# Patient Record
Sex: FEMALE | Race: AMERICAN INDIAN OR ALASKA NATIVE | HISPANIC OR LATINO | Employment: FULL TIME | URBAN - METROPOLITAN AREA
[De-identification: names, ages, dates, MRNs, and addresses within clinical notes are randomized per-mention and may not be internally consistent; named-entity substitution may affect disease eponyms.]

---

## 2021-03-28 ENCOUNTER — HOSPITAL ENCOUNTER (EMERGENCY)
Facility: CLINIC | Age: 21
Discharge: HOME | End: 2021-03-28
Attending: EMERGENCY MEDICINE | Admitting: EMERGENCY MEDICINE
Payer: COMMERCIAL

## 2021-03-28 VITALS
WEIGHT: 188 LBS | BODY MASS INDEX: 34.6 KG/M2 | TEMPERATURE: 97.8 F | HEART RATE: 76 BPM | SYSTOLIC BLOOD PRESSURE: 113 MMHG | DIASTOLIC BLOOD PRESSURE: 68 MMHG | HEIGHT: 62 IN | OXYGEN SATURATION: 98 % | RESPIRATION RATE: 16 BRPM

## 2021-03-28 DIAGNOSIS — R30.0 DYSURIA: ICD-10-CM

## 2021-03-28 LAB
ALBUMIN UR-MCNC: NEGATIVE MG/DL
APPEARANCE UR: CLEAR
BACTERIA #/AREA URNS HPF: ABNORMAL /HPF
BILIRUB UR QL STRIP: NEGATIVE
COLOR UR AUTO: YELLOW
GLUCOSE UR STRIP-MCNC: NEGATIVE MG/DL
HCG UR QL: NEGATIVE
HGB UR QL STRIP: ABNORMAL
KETONES UR STRIP-MCNC: NEGATIVE MG/DL
LEUKOCYTE ESTERASE UR QL STRIP: NEGATIVE
MUCOUS THREADS #/AREA URNS LPF: PRESENT /LPF
NITRATE UR QL: NEGATIVE
PH UR STRIP: 5.5 PH (ref 5–7)
RBC #/AREA URNS AUTO: 1 /HPF (ref 0–2)
SOURCE: ABNORMAL
SP GR UR STRIP: 1.01 (ref 1–1.03)
SPECIMEN SOURCE: ABNORMAL
SQUAMOUS #/AREA URNS AUTO: 4 /HPF (ref 0–1)
UROBILINOGEN UR STRIP-MCNC: 2 MG/DL (ref 0–2)
WBC #/AREA URNS AUTO: 4 /HPF (ref 0–5)
WET PREP SPEC: ABNORMAL

## 2021-03-28 PROCEDURE — 81025 URINE PREGNANCY TEST: CPT | Performed by: EMERGENCY MEDICINE

## 2021-03-28 PROCEDURE — 81001 URINALYSIS AUTO W/SCOPE: CPT | Performed by: EMERGENCY MEDICINE

## 2021-03-28 PROCEDURE — 87591 N.GONORRHOEAE DNA AMP PROB: CPT | Performed by: EMERGENCY MEDICINE

## 2021-03-28 PROCEDURE — 99283 EMERGENCY DEPT VISIT LOW MDM: CPT | Performed by: EMERGENCY MEDICINE

## 2021-03-28 PROCEDURE — 99284 EMERGENCY DEPT VISIT MOD MDM: CPT | Performed by: EMERGENCY MEDICINE

## 2021-03-28 PROCEDURE — 87210 SMEAR WET MOUNT SALINE/INK: CPT | Performed by: EMERGENCY MEDICINE

## 2021-03-28 PROCEDURE — 87491 CHLMYD TRACH DNA AMP PROBE: CPT | Performed by: EMERGENCY MEDICINE

## 2021-03-28 PROCEDURE — 87086 URINE CULTURE/COLONY COUNT: CPT | Performed by: EMERGENCY MEDICINE

## 2021-03-28 RX ORDER — ALBUTEROL SULFATE 90 UG/1
2 AEROSOL, METERED RESPIRATORY (INHALATION) EVERY 6 HOURS
COMMUNITY
End: 2021-12-03

## 2021-03-28 RX ORDER — METRONIDAZOLE 500 MG/1
500 TABLET ORAL 3 TIMES DAILY
Qty: 21 TABLET | Refills: 0 | Status: SHIPPED | OUTPATIENT
Start: 2021-03-28 | End: 2021-04-04

## 2021-03-28 RX ORDER — METRONIDAZOLE 500 MG/1
500 TABLET ORAL 4 TIMES DAILY
Qty: 28 TABLET | Refills: 0 | Status: SHIPPED | OUTPATIENT
Start: 2021-03-28 | End: 2021-04-04

## 2021-03-28 ASSESSMENT — ENCOUNTER SYMPTOMS
ABDOMINAL PAIN: 1
BACK PAIN: 1
FEVER: 0
CHILLS: 0
DYSURIA: 1
NAUSEA: 0
VOMITING: 0
FREQUENCY: 1

## 2021-03-28 ASSESSMENT — MIFFLIN-ST. JEOR: SCORE: 1581.01

## 2021-03-28 NOTE — RESULT ENCOUNTER NOTE
[facility] Emergency Dept scharge antibiotic (if prescribed): None  No changes in treatment per [facility] ED Lab Result Urine Urine culture protocol.

## 2021-03-28 NOTE — LETTER
March 28, 2021      To Whom It May Concern:      Katharine Piña was seen in our Emergency Department today, 03/28/21.  I expect her condition to improve over the next 2 days.  She may return to work/school when improved.    Sincerely,        Felipe Miller MD

## 2021-03-28 NOTE — ED PROVIDER NOTES
[facility] EMERGENCY DEPARTMENT ([facility])     March 28, 2021    History     Chief Complaint   Patient presents with     Urinary Frequency     Patient reports urinary frequency and urgency since monday, burning sensation lsat monday but has resolve since then.      The history is provided by the patient and medical records.     Katharine Piña is an otherwise healthy 20 year old female who presents here to the Emergency Department due to urinary frequency and urgency.  Patient reports urinary symptoms that started on Monday, 6 days ago.  She reports urinary urgency, frequency, and dysuria.  Patient state the dysuria has since resolved.  Patient reports some mid abdominal pain that she states radiates through to her back somewhat.  She denies nausea or vomiting.  Patient denies fevers or chills.  Patient denies vaginal discharge.  Patient reports a history of urinary infections.  She states this feels similar to previous infections.  Patient expresses some concern for pregnancy.    PAST MEDICAL HISTORY: No past medical history on file.    PAST SURGICAL HISTORY: No past surgical history on file.    Past medical history, past surgical history, medications, and allergies were reviewed with the patient. Additional pertinent items: None    FAMILY HISTORY: No family history on file.    SOCIAL HISTORY:   Social History     Tobacco Use     Smoking status: Not on file   Substance Use Topics     Alcohol use: Not on file     Social history was reviewed with the patient. Additional pertinent items: None      Patient's Medications   New Prescriptions    No medications on file   Previous Medications    ALBUTEROL (PROAIR HFA/PROVENTIL HFA/VENTOLIN HFA) 108 (90 BASE) MCG/ACT INHALER    Inhale 2 puffs into the lungs every 6 hours    WIXELA INHUB 100-50 MCG/DOSE INHALER    Inhale 1 puff into the lungs 2 times daily   Modified Medications    No medications on file   Discontinued Medications    No medications on file         No Known Allergies     Review of Systems   Constitutional: Negative for chills and fever.   Gastrointestinal: Positive for abdominal pain (mid). Negative for nausea and vomiting.   Genitourinary: Positive for dysuria, frequency and urgency.   Musculoskeletal: Positive for back pain (2/2 back pain).   All other systems reviewed and are negative.    A complete review of systems was performed with pertinent positives and negatives noted in the HPI, and all other systems negative.    Physical Exam          Physical Exam  Vitals signs and nursing note reviewed.   Constitutional:       General: She is not in acute distress.     Appearance: She is not diaphoretic.   HENT:      Head: Normocephalic and atraumatic.   Eyes:      Conjunctiva/sclera: Conjunctivae normal.      Pupils: Pupils are equal, round, and reactive to light.   Neck:      Musculoskeletal: Normal range of motion and neck supple.   Cardiovascular:      Rate and Rhythm: Normal rate and regular rhythm.   Pulmonary:      Effort: Pulmonary effort is normal. No respiratory distress.   Abdominal:      General: There is no distension.      Palpations: Abdomen is soft.      Tenderness: There is no abdominal tenderness. There is left CVA tenderness. There is no right CVA tenderness or guarding.   Musculoskeletal: Normal range of motion.         General: No signs of injury.   Skin:     General: Skin is warm and dry.   Neurological:      Mental Status: She is alert and oriented to person, place, and time.   Psychiatric:         Mood and Affect: Mood normal.         Behavior: Behavior normal.         Thought Content: Thought content normal.         ED Course   12:06 PM  The patient was seen and examined by Felipe Miller MD in Room ED07.       Procedures                           No results found for this or any previous visit (from the past 24 hour(s)).  Medications - No data to display          Assessments & Plan (with Medical Decision Making)   Katharine knapp  with urinary frequency and urgency.  Urinalysis was not convincing for a UTI with only 4 WBC and neg nitrite and LE.  Wet prepe showed BV.  GC/Chlamydia are pending.  Exam was benign and I don't expect other serious pathology.  Katharine will be treated with Flagyl for BV.      I have reviewed the nursing notes.    I have reviewed the findings, diagnosis, plan and need for follow up with the patient.    New Prescriptions    No medications on file       Final diagnoses:   None   I, Shelby Garcia, am serving as a trained medical scribe to document services personally performed by Felipe Miller MD, based on the provider's statements to me.     I, Felipe Miller MD, was physically present and have reviewed and verified the accuracy of this note documented by Shelby Garcia.      --  Felipe Miller MD  3/28/2021   [facility] EMERGENCY DEPARTMENT     Felipe Miller MD  03/28/21 1725

## 2021-03-29 LAB
BACTERIA SPEC CULT: NORMAL
C TRACH DNA SPEC QL NAA+PROBE: NEGATIVE
Lab: NORMAL
N GONORRHOEA DNA SPEC QL NAA+PROBE: NEGATIVE
SPECIMEN SOURCE: NORMAL

## 2021-03-29 NOTE — RESULT ENCOUNTER NOTE
Final urine culture report is NEGATIVE  No change in treatment per [facility] ED Lab Result Urine Culture protocol.

## 2021-03-29 NOTE — RESULT ENCOUNTER NOTE
Final result for both N. Gonorrhoeae PCR and Chlamydia Trachomatis PCR are NEGATIVE.  No treatment or change in treatment per [place] ED Lab Result protocol.

## 2021-12-03 ENCOUNTER — APPOINTMENT (OUTPATIENT)
Dept: GENERAL RADIOLOGY | Facility: CLINIC | Age: 21
End: 2021-12-03
Attending: INTERNAL MEDICINE
Payer: COMMERCIAL

## 2021-12-03 ENCOUNTER — HOSPITAL ENCOUNTER (EMERGENCY)
Facility: CLINIC | Age: 21
Discharge: HOME | End: 2021-12-03
Attending: INTERNAL MEDICINE | Admitting: INTERNAL MEDICINE
Payer: COMMERCIAL

## 2021-12-03 VITALS
TEMPERATURE: 98.2 F | SYSTOLIC BLOOD PRESSURE: 136 MMHG | HEIGHT: 62 IN | BODY MASS INDEX: 32.02 KG/M2 | OXYGEN SATURATION: 98 % | WEIGHT: 174 LBS | DIASTOLIC BLOOD PRESSURE: 82 MMHG | RESPIRATION RATE: 16 BRPM | HEART RATE: 70 BPM

## 2021-12-03 DIAGNOSIS — J10.1 INFLUENZA A: ICD-10-CM

## 2021-12-03 DIAGNOSIS — J18.9 PNEUMONIA DUE TO INFECTIOUS ORGANISM, UNSPECIFIED LATERALITY, UNSPECIFIED PART OF LUNG: ICD-10-CM

## 2021-12-03 DIAGNOSIS — Z11.52 ENCOUNTER FOR SCREENING LABORATORY TESTING FOR SEVERE ACUTE RESPIRATORY SYNDROME CORONAVIRUS 2 (SARS-COV-2): ICD-10-CM

## 2021-12-03 LAB
ALBUMIN UR-MCNC: 10 MG/DL
ANION GAP SERPL CALCULATED.3IONS-SCNC: 5 MMOL/L (ref 3–14)
APPEARANCE UR: ABNORMAL
BACTERIA #/AREA URNS HPF: ABNORMAL /HPF
BASOPHILS # BLD AUTO: 0.1 10E3/UL (ref 0–0.2)
BASOPHILS NFR BLD AUTO: 1 %
BILIRUB UR QL STRIP: NEGATIVE
BUN SERPL-MCNC: 6 MG/DL (ref 7–30)
CALCIUM SERPL-MCNC: 9 MG/DL (ref 8.5–10.1)
CHLORIDE BLD-SCNC: 109 MMOL/L (ref 94–109)
CO2 SERPL-SCNC: 25 MMOL/L (ref 20–32)
COLOR UR AUTO: YELLOW
CREAT SERPL-MCNC: 0.51 MG/DL (ref 0.52–1.04)
EOSINOPHIL # BLD AUTO: 0.1 10E3/UL (ref 0–0.7)
EOSINOPHIL NFR BLD AUTO: 1 %
ERYTHROCYTE [DISTWIDTH] IN BLOOD BY AUTOMATED COUNT: 12.5 % (ref 10–15)
FLUAV RNA SPEC QL NAA+PROBE: POSITIVE
FLUBV RNA RESP QL NAA+PROBE: NEGATIVE
GFR SERPL CREATININE-BSD FRML MDRD: >90 ML/MIN/1.73M2
GLUCOSE BLD-MCNC: 80 MG/DL (ref 70–99)
GLUCOSE UR STRIP-MCNC: NEGATIVE MG/DL
HCG UR QL: NEGATIVE
HCT VFR BLD AUTO: 40.4 % (ref 35–47)
HGB BLD-MCNC: 13.9 G/DL (ref 11.7–15.7)
HGB UR QL STRIP: NEGATIVE
HOLD SPECIMEN: NORMAL
IMM GRANULOCYTES # BLD: 0 10E3/UL
IMM GRANULOCYTES NFR BLD: 0 %
INTERNAL QC OK POCT: NORMAL
KETONES UR STRIP-MCNC: NEGATIVE MG/DL
LEUKOCYTE ESTERASE UR QL STRIP: ABNORMAL
LYMPHOCYTES # BLD AUTO: 1.3 10E3/UL (ref 0.8–5.3)
LYMPHOCYTES NFR BLD AUTO: 38 %
MCH RBC QN AUTO: 31.6 PG (ref 26.5–33)
MCHC RBC AUTO-ENTMCNC: 34.4 G/DL (ref 31.5–36.5)
MCV RBC AUTO: 92 FL (ref 78–100)
MONOCYTES # BLD AUTO: 0.5 10E3/UL (ref 0–1.3)
MONOCYTES NFR BLD AUTO: 14 %
MUCOUS THREADS #/AREA URNS LPF: PRESENT /LPF
NEUTROPHILS # BLD AUTO: 1.6 10E3/UL (ref 1.6–8.3)
NEUTROPHILS NFR BLD AUTO: 46 %
NITRATE UR QL: NEGATIVE
NRBC # BLD AUTO: 0 10E3/UL
NRBC BLD AUTO-RTO: 0 /100
PH UR STRIP: 6.5 [PH] (ref 5–7)
PLATELET # BLD AUTO: 304 10E3/UL (ref 150–450)
POTASSIUM BLD-SCNC: 3.2 MMOL/L (ref 3.4–5.3)
RBC # BLD AUTO: 4.4 10E6/UL (ref 3.8–5.2)
RBC URINE: 1 /HPF
SARS-COV-2 RNA RESP QL NAA+PROBE: NEGATIVE
SODIUM SERPL-SCNC: 139 MMOL/L (ref 133–144)
SP GR UR STRIP: 1.02 (ref 1–1.03)
SQUAMOUS EPITHELIAL: 20 /HPF
TRANSITIONAL EPI: <1 /HPF
UROBILINOGEN UR STRIP-MCNC: 4 MG/DL
WBC # BLD AUTO: 3.6 10E3/UL (ref 4–11)
WBC URINE: 3 /HPF

## 2021-12-03 PROCEDURE — 71045 X-RAY EXAM CHEST 1 VIEW: CPT

## 2021-12-03 PROCEDURE — 81025 URINE PREGNANCY TEST: CPT | Performed by: INTERNAL MEDICINE

## 2021-12-03 PROCEDURE — 81003 URINALYSIS AUTO W/O SCOPE: CPT | Performed by: INTERNAL MEDICINE

## 2021-12-03 PROCEDURE — 36415 COLL VENOUS BLD VENIPUNCTURE: CPT | Performed by: INTERNAL MEDICINE

## 2021-12-03 PROCEDURE — 85025 COMPLETE CBC W/AUTO DIFF WBC: CPT | Performed by: INTERNAL MEDICINE

## 2021-12-03 PROCEDURE — C9803 HOPD COVID-19 SPEC COLLECT: HCPCS

## 2021-12-03 PROCEDURE — 87636 SARSCOV2 & INF A&B AMP PRB: CPT | Performed by: INTERNAL MEDICINE

## 2021-12-03 PROCEDURE — 99284 EMERGENCY DEPT VISIT MOD MDM: CPT | Mod: 25

## 2021-12-03 PROCEDURE — 99284 EMERGENCY DEPT VISIT MOD MDM: CPT | Performed by: INTERNAL MEDICINE

## 2021-12-03 PROCEDURE — 80048 BASIC METABOLIC PNL TOTAL CA: CPT | Performed by: INTERNAL MEDICINE

## 2021-12-03 RX ORDER — ALBUTEROL SULFATE 90 UG/1
2 AEROSOL, METERED RESPIRATORY (INHALATION) EVERY 6 HOURS
Qty: 18 G | Refills: 0 | Status: SHIPPED | OUTPATIENT
Start: 2021-12-03 | End: 2021-12-03

## 2021-12-03 RX ORDER — AZITHROMYCIN 250 MG/1
TABLET, FILM COATED ORAL
Qty: 6 TABLET | Refills: 0 | Status: SHIPPED | OUTPATIENT
Start: 2021-12-03 | End: 2024-01-08

## 2021-12-03 RX ORDER — ALBUTEROL SULFATE 90 UG/1
2 AEROSOL, METERED RESPIRATORY (INHALATION) EVERY 6 HOURS
Qty: 18 G | Refills: 0 | Status: SHIPPED | OUTPATIENT
Start: 2021-12-03 | End: 2022-08-06

## 2021-12-03 RX ORDER — AZITHROMYCIN 250 MG/1
TABLET, FILM COATED ORAL
Qty: 6 TABLET | Refills: 0 | Status: SHIPPED | OUTPATIENT
Start: 2021-12-03 | End: 2021-12-03

## 2021-12-03 ASSESSMENT — ENCOUNTER SYMPTOMS
HEADACHES: 0
DIFFICULTY URINATING: 0
CHILLS: 1
COLOR CHANGE: 0
CONFUSION: 0
SHORTNESS OF BREATH: 0
NAUSEA: 0
FEVER: 1
ARTHRALGIAS: 0
COUGH: 1
ABDOMINAL PAIN: 0
NECK STIFFNESS: 0
EYE REDNESS: 0
VOMITING: 0
GASTROINTESTINAL NEGATIVE: 1

## 2021-12-03 ASSESSMENT — MIFFLIN-ST. JEOR: SCORE: 1512.51

## 2021-12-03 NOTE — LETTER
December 3, 2021      To Whom It May Concern:      Katharine iPña was seen in our Emergency Department today, 12/03/21.  I expect her condition to improve over the next 5 days.  She may return to work/school when improved.    Sincerely,        Teja Denson, DO

## 2021-12-03 NOTE — ED PROVIDER NOTES
[facility] [place] [place] ([facility])  12/03/21  History     Chief Complaint   Patient presents with     Fever     subjective started Wednesday     Cough     hx asthma, unvaccinated with COVID, ran out of inhaler      The history is provided by the patient.     Katharine Piña is a 21 year old female who has a significant medical history of asthma and CAMRYN who presents to the Emergency Department with c/o subjective fever and cough for 2 days.  She denies being vaccinated for COVID-19 or vaccinated for the distance years influenza.  She reports 2 days of subjective fever, cough, and diffuse body aches.  She denies experiencing nausea, vomiting,  symptoms, or GI symptoms.    Past Medical History  Past Medical History:   Diagnosis Date     Uncomplicated asthma      No past surgical history on file.  albuterol (PROAIR HFA/PROVENTIL HFA/VENTOLIN HFA) 108 (90 Base) MCG/ACT inhaler  Fexofenadine HCl (ALLEGRA PO)  WIXELA INHUB 100-50 MCG/DOSE inhaler      No Known Allergies  Family History  No family history on file.  Social History   Social History     Tobacco Use     Smoking status: Never Smoker     Smokeless tobacco: Never Used   Substance Use Topics     Alcohol use: Not Currently     Drug use: Yes     Types: Marijuana      Past medical history, past surgical history, medications, allergies, family history, and social history were reviewed with the patient. No additional pertinent items.     I have reviewed the Medications, Allergies, Past Medical and Surgical History, and Social History in the Epic system.    Review of Systems   Constitutional: Positive for chills and fever.   HENT: Negative for congestion.    Eyes: Negative for redness.   Respiratory: Positive for cough. Negative for shortness of breath.    Cardiovascular: Negative for chest pain.   Gastrointestinal: Negative.  Negative for abdominal pain, nausea and vomiting.   Genitourinary: Negative.  Negative for difficulty urinating.  "  Musculoskeletal: Negative for arthralgias and neck stiffness.   Skin: Negative for color change.   Neurological: Negative for headaches.   Psychiatric/Behavioral: Negative for confusion.     A complete review of systems was performed with pertinent positives and negatives noted in the HPI, and all other systems negative.    Physical Exam   BP: 136/82  Pulse: 70  Temp: 98.2  F (36.8  C)  Resp: 16  Height: 157.5 cm (5' 2\")  Weight: 78.9 kg (174 lb)  SpO2: 99 %      Physical Exam  Constitutional:       General: She is not in acute distress.     Appearance: She is not diaphoretic.   HENT:      Head: Atraumatic.      Mouth/Throat:      Pharynx: No oropharyngeal exudate.   Eyes:      General: No scleral icterus.     Pupils: Pupils are equal, round, and reactive to light.   Cardiovascular:      Rate and Rhythm: Normal rate and regular rhythm.      Heart sounds: Normal heart sounds. No murmur heard.  No friction rub. No gallop.    Pulmonary:      Effort: Pulmonary effort is normal. No respiratory distress.      Breath sounds: Normal breath sounds. No stridor. No wheezing, rhonchi or rales.   Chest:      Chest wall: No tenderness.   Abdominal:      General: Bowel sounds are normal. There is no distension.      Palpations: Abdomen is soft. There is no mass.      Tenderness: There is no abdominal tenderness. There is no right CVA tenderness, left CVA tenderness, guarding or rebound.      Hernia: No hernia is present.   Musculoskeletal:         General: No tenderness.      Cervical back: Neck supple.   Skin:     General: Skin is warm.      Findings: No rash.   Neurological:      General: No focal deficit present.      Cranial Nerves: No cranial nerve deficit.         ED Course     At 2:26 PM the patient was seen and examined by Maddi Wagner Md in Room ED19.        Procedures           Results for orders placed or performed during the hospital encounter of 12/03/21 (from the past 24 hour(s))   CBC with platelets differential "    Narrative    The following orders were created for panel order CBC with platelets differential.  Procedure                               Abnormality         Status                     ---------                               -----------         ------                     CBC with platelets and d...[[id number]]  Abnormal            Final result                 Please view results for these tests on the individual orders.   Basic metabolic panel   Result Value Ref Range    Sodium 139 133 - 144 mmol/L    Potassium 3.2 (L) 3.4 - 5.3 mmol/L    Chloride 109 94 - 109 mmol/L    Carbon Dioxide (CO2) 25 20 - 32 mmol/L    Anion Gap 5 3 - 14 mmol/L    Urea Nitrogen 6 (L) 7 - 30 mg/dL    Creatinine 0.51 (L) 0.52 - 1.04 mg/dL    Calcium 9.0 8.5 - 10.1 mg/dL    Glucose 80 70 - 99 mg/dL    GFR Estimate >90 >60 mL/min/1.73m2   CBC with platelets and differential   Result Value Ref Range    WBC Count 3.6 (L) 4.0 - 11.0 10e3/uL    RBC Count 4.40 3.80 - 5.20 10e6/uL    Hemoglobin 13.9 11.7 - 15.7 g/dL    Hematocrit 40.4 35.0 - 47.0 %    MCV 92 78 - 100 fL    MCH 31.6 26.5 - 33.0 pg    MCHC 34.4 31.5 - 36.5 g/dL    RDW 12.5 10.0 - 15.0 %    Platelet Count 304 150 - 450 10e3/uL    % Neutrophils 46 %    % Lymphocytes 38 %    % Monocytes 14 %    % Eosinophils 1 %    % Basophils 1 %    % Immature Granulocytes 0 %    NRBCs per 100 WBC 0 <1 /100    Absolute Neutrophils 1.6 1.6 - 8.3 10e3/uL    Absolute Lymphocytes 1.3 0.8 - 5.3 10e3/uL    Absolute Monocytes 0.5 0.0 - 1.3 10e3/uL    Absolute Eosinophils 0.1 0.0 - 0.7 10e3/uL    Absolute Basophils 0.1 0.0 - 0.2 10e3/uL    Absolute Immature Granulocytes 0.0 <=0.4 10e3/uL    Absolute NRBCs 0.0 10e3/uL   [place] Draw    Narrative    The following orders were created for panel order [place] Draw.  Procedure                               Abnormality         Status                     ---------                               -----------         ------                     Extra Red Top Tube[[id number]]                                In process                   Please view results for these tests on the individual orders.   UA with Microscopic reflex to Culture    Specimen: Urine, Midstream   Result Value Ref Range    Color Urine Yellow Colorless, Straw, Light Yellow, Yellow    Appearance Urine Slightly Cloudy (A) Clear    Glucose Urine Negative Negative mg/dL    Bilirubin Urine Negative Negative    Ketones Urine Negative Negative mg/dL    Specific Gravity Urine 1.016 1.003 - 1.035    Blood Urine Negative Negative    pH Urine 6.5 5.0 - 7.0    Protein Albumin Urine 10  (A) Negative mg/dL    Urobilinogen Urine 4.0 (A) Normal, 2.0 mg/dL    Nitrite Urine Negative Negative    Leukocyte Esterase Urine Trace (A) Negative    Bacteria Urine Few (A) None Seen /HPF    Mucus Urine Present (A) None Seen /LPF    RBC Urine 1 <=2 /HPF    WBC Urine 3 <=5 /HPF    Squamous Epithelials Urine 20 (H) <=1 /HPF    Transitional Epithelials Urine <1 <=1 /HPF    Narrative    Urine Culture not indicated   hCG qual urine POCT   Result Value Ref Range    HCG Qual Urine Negative Negative    Internal QC Check POCT Valid Valid   XR Chest Port 1 View    Narrative    CHEST ONE VIEW PORTABLE   12/3/2021 3:34 PM     HISTORY:  Fever. Cough.    COMPARISON: None.      Impression    IMPRESSION: Mild hazy opacities in both lower lungs are suspicious for  pneumonia. No pneumothorax. Pulmonary vascularity is within normal  limits.     Medications - No data to display          Assessments & Plan (with Medical Decision Making)   Katharine Piña is a 21 year old female with h/o asthma presents with 3 days h/o cough and fever, DDx incldues COVID Flu PNA among others. CXR suspicious for pneumonia as above but no HD instability, no hypoxia. Awaiting COVID Flu. Plan to start zithro as in z pack. Follow up with her PMD in one week.    I have reviewed the nursing notes.    I have reviewed the findings, diagnosis, plan and need for follow up with the patient.    New  Prescriptions    No medications on file       Final diagnoses:   Pneumonia due to infectious organism, unspecified laterality, unspecified part of lung       I, Vargas Palomino am serving as a trained medical scribe to document services personally performed by Maddi Wagner Md, based on the provider's statements to me.      Maddi HERNANDEZ Md, was physically present and have reviewed and verified the accuracy of this note documented by Vargas Palomino.     Maddi Wagner Md  12/3/2021   [facility] EMERGENCY DEPARTMENT     Maddi Wagner MD  12/06/21 [id number]

## 2022-06-09 ENCOUNTER — HOSPITAL ENCOUNTER (EMERGENCY)
Facility: CLINIC | Age: 22
Discharge: HOME | End: 2022-06-09
Attending: FAMILY MEDICINE | Admitting: FAMILY MEDICINE
Payer: COMMERCIAL

## 2022-06-09 ENCOUNTER — APPOINTMENT (OUTPATIENT)
Dept: GENERAL RADIOLOGY | Facility: CLINIC | Age: 22
End: 2022-06-09
Attending: FAMILY MEDICINE
Payer: COMMERCIAL

## 2022-06-09 VITALS
RESPIRATION RATE: 18 BRPM | BODY MASS INDEX: 33.13 KG/M2 | SYSTOLIC BLOOD PRESSURE: 124 MMHG | HEIGHT: 62 IN | OXYGEN SATURATION: 98 % | WEIGHT: 180 LBS | TEMPERATURE: 98.1 F | HEART RATE: 115 BPM | DIASTOLIC BLOOD PRESSURE: 79 MMHG

## 2022-06-09 DIAGNOSIS — J45.41 MODERATE PERSISTENT ASTHMA WITH EXACERBATION: ICD-10-CM

## 2022-06-09 LAB
FLUAV RNA SPEC QL NAA+PROBE: NEGATIVE
FLUBV RNA RESP QL NAA+PROBE: NEGATIVE
HCG UR QL: NEGATIVE
INTERNAL QC OK POCT: NORMAL
POCT KIT EXPIRATION DATE: NORMAL
POCT KIT LOT NUMBER: NORMAL
SARS-COV-2 RNA RESP QL NAA+PROBE: NEGATIVE

## 2022-06-09 PROCEDURE — 99284 EMERGENCY DEPT VISIT MOD MDM: CPT | Performed by: FAMILY MEDICINE

## 2022-06-09 PROCEDURE — 99285 EMERGENCY DEPT VISIT HI MDM: CPT | Mod: 25 | Performed by: FAMILY MEDICINE

## 2022-06-09 PROCEDURE — 87636 SARSCOV2 & INF A&B AMP PRB: CPT | Performed by: FAMILY MEDICINE

## 2022-06-09 PROCEDURE — 81025 URINE PREGNANCY TEST: CPT | Performed by: FAMILY MEDICINE

## 2022-06-09 PROCEDURE — 94640 AIRWAY INHALATION TREATMENT: CPT | Performed by: FAMILY MEDICINE

## 2022-06-09 PROCEDURE — C9803 HOPD COVID-19 SPEC COLLECT: HCPCS | Performed by: FAMILY MEDICINE

## 2022-06-09 PROCEDURE — 71045 X-RAY EXAM CHEST 1 VIEW: CPT

## 2022-06-09 PROCEDURE — 250N000012 HC RX MED GY IP 250 OP 636 PS 637: Performed by: FAMILY MEDICINE

## 2022-06-09 PROCEDURE — 250N000009 HC RX 250: Performed by: FAMILY MEDICINE

## 2022-06-09 RX ORDER — IPRATROPIUM BROMIDE AND ALBUTEROL SULFATE 2.5; .5 MG/3ML; MG/3ML
3 SOLUTION RESPIRATORY (INHALATION) ONCE
Status: COMPLETED | OUTPATIENT
Start: 2022-06-09 | End: 2022-06-09

## 2022-06-09 RX ORDER — PREDNISONE 20 MG/1
TABLET ORAL
Qty: 10 TABLET | Refills: 0 | Status: SHIPPED | OUTPATIENT
Start: 2022-06-09 | End: 2022-10-10

## 2022-06-09 RX ORDER — ALBUTEROL SULFATE 90 UG/1
2 AEROSOL, METERED RESPIRATORY (INHALATION) EVERY 4 HOURS PRN
Qty: 18 G | Refills: 1 | Status: SHIPPED | OUTPATIENT
Start: 2022-06-09 | End: 2022-08-06

## 2022-06-09 RX ORDER — PREDNISONE 20 MG/1
40 TABLET ORAL ONCE
Status: COMPLETED | OUTPATIENT
Start: 2022-06-09 | End: 2022-06-09

## 2022-06-09 RX ADMIN — IPRATROPIUM BROMIDE AND ALBUTEROL SULFATE 3 ML: 2.5; .5 SOLUTION RESPIRATORY (INHALATION) at 14:28

## 2022-06-09 RX ADMIN — IPRATROPIUM BROMIDE AND ALBUTEROL SULFATE 3 ML: 2.5; .5 SOLUTION RESPIRATORY (INHALATION) at 17:27

## 2022-06-09 RX ADMIN — IPRATROPIUM BROMIDE AND ALBUTEROL SULFATE 3 ML: 2.5; .5 SOLUTION RESPIRATORY (INHALATION) at 15:46

## 2022-06-09 RX ADMIN — PREDNISONE 40 MG: 20 TABLET ORAL at 17:27

## 2022-06-09 ASSESSMENT — ENCOUNTER SYMPTOMS
CHILLS: 0
NERVOUS/ANXIOUS: 1
COUGH: 1
RHINORRHEA: 1
PHOTOPHOBIA: 0
VOICE CHANGE: 0
DYSURIA: 0
DECREASED CONCENTRATION: 0
APPETITE CHANGE: 0
TROUBLE SWALLOWING: 0
CHOKING: 0
ACTIVITY CHANGE: 1
NAUSEA: 0
NUMBNESS: 0
BRUISES/BLEEDS EASILY: 0
AGITATION: 0
CHEST TIGHTNESS: 1
VOMITING: 0
ABDOMINAL PAIN: 0
LIGHT-HEADEDNESS: 0
FLANK PAIN: 0
DYSPHORIC MOOD: 0
SEIZURES: 0
CONFUSION: 0
WEAKNESS: 0
WHEEZING: 1
HEADACHES: 0
BACK PAIN: 0
DIAPHORESIS: 0
FEVER: 0
SHORTNESS OF BREATH: 1

## 2022-06-09 NOTE — DISCHARGE INSTRUCTIONS
Home.  Your xray looks good.  Your swab for covid was negative.  More likely asthma triggered attack.  Use your daily medication.  Use the albuterol inhaler as needed for breathing.  Take the prednisone for 5 days.  Return if any concerns.  Follow up with MD for a recheck.    Results for orders placed or performed during the hospital encounter of 06/09/22   XR Chest Port 1 View     Status: None    Narrative    XR CHEST PORT 1 VIEW 6/9/2022 3:38 PM    HISTORY: sob asthma covid labs pending    COMPARISON: 12/3/2021      Impression    IMPRESSION: No infiltrate, pleural effusion or pneumothorax. The  cardiac and mediastinal silhouettes are normal.    PATRICIA OSBORNE MD         SYSTEM ID:  [id number]   Symptomatic; Yes; 6/9/2022 Influenza A/B & SARS-CoV2 (COVID-19) Virus PCR Multiplex Nose     Status: Normal    Specimen: Nose; Swab   Result Value Ref Range    Influenza A PCR Negative Negative    Influenza B PCR Negative Negative    SARS CoV2 PCR Negative Negative    Narrative    Testing was performed using the lena SARS-CoV-2 & Influenza A/B Assay on the lena Alla System. This test should be ordered for the detection of SARS-CoV-2 and influenza viruses in individuals who meet clinical and/or epidemiological criteria. Test performance is unknown in asymptomatic patients. This test is for in vitro diagnostic use under the FDA EUA for laboratories certified under CLIA to perform moderate and/or high complexity testing. This test has not been FDA cleared or approved. A negative result does not rule out the presence of PCR inhibitors in the specimen or target RNA in concentration below the limit of detection for the assay. If only one viral target is positive but coinfection with multiple targets is suspected, the sample should be re-tested with another FDA cleared, approved or authorized test, if coinfection would change clinical management. [facility] are certified under the Clinical Laboratory Improvement  Amendments of 1988 (CLIA-88) as  qualified to perform moderate and/or high complexity laboratory testing.   hCG qual urine POCT     Status: Normal   Result Value Ref Range    HCG Qual Urine Negative Negative    Internal QC Check POCT Valid Valid    POCT Kit Lot Number [id number]     POCT Kit Expiration Date 09-

## 2022-06-09 NOTE — ED TRIAGE NOTES
Triage Assessment     Row Name 06/09/22 [id number]       Triage Assessment (Adult)    Airway WDL X    Additional Documentation Breath Sounds (Group)       Respiratory WDL    Respiratory WDL X;rhythm/pattern    Rhythm/Pattern, Respiratory tachypneic;shortness of breath       Breath Sounds    Breath Sounds All Fields    All Lung Fields Breath Sounds wheezes, expiratory       Skin Circulation/Temperature WDL    Skin Circulation/Temperature WDL WDL       Cardiac WDL    Cardiac WDL WDL       Peripheral/Neurovascular WDL    Peripheral Neurovascular WDL WDL       Cognitive/Neuro/Behavioral WDL    Cognitive/Neuro/Behavioral WDL WDL

## 2022-06-09 NOTE — ED TRIAGE NOTES
Patient states that she is having really bad wheezing, coughing, and sneezing. Patient states it started the other day, and states she woke up today and can hardly breathe. She states to have used all her inhalers and nebulizer and nothing is working.

## 2022-06-09 NOTE — LETTER
June 9, 2022      To Whom It May Concern:      Katharine Piña was seen in our Emergency Department today, 06/09/22.  I expect her condition to improve over the next 3-4 days.  She may return to work/school 6/13/2022.    Sincerely,        Nelida Ken RN

## 2022-06-09 NOTE — ED PROVIDER NOTES
ED Provider Note  [facility]      History     Chief Complaint   Patient presents with     Cough     Shortness of Breath     Patient has a history of asthma and has taken all of her medication, but nothing is helping.      HPI  Katharine Piña is a 21 year old female who presents emergency room with shortness of breath cough asthma tightness.  Patient states she been fairly well controlled is on a long-acting with steroid inhaler treatments along with rescue inhaler as needed.  Patient states she felt fine yesterday today woke with sneezing increasing  wheezing tightness not relieved by her medications presents emergency room valuation describes chest tightness without chest pain no hemoptysis she does vaccinated for COVID has not had no other known exposures no fevers or chills etc.  No cardiac symptoms otherwise presents still feeling short of breath tight and wheezy.    Patient states she is never hospitalized for asthma.  No other complications with this.    Past Medical History  Past Medical History:   Diagnosis Date     Uncomplicated asthma      No past surgical history on file.  albuterol (PROAIR HFA/PROVENTIL HFA/VENTOLIN HFA) 108 (90 Base) MCG/ACT inhaler  predniSONE (DELTASONE) 20 MG tablet  albuterol (PROAIR HFA/PROVENTIL HFA/VENTOLIN HFA) 108 (90 Base) MCG/ACT inhaler  azithromycin (ZITHROMAX Z-JAMES) 250 MG tablet  Fexofenadine HCl (ALLEGRA PO)  WIXELA INHUB 100-50 MCG/DOSE inhaler      No Known Allergies  Family History  No family history on file.  Social History   Social History     Tobacco Use     Smoking status: Never Smoker     Smokeless tobacco: Never Used   Substance Use Topics     Alcohol use: Not Currently     Drug use: Yes     Types: Marijuana      Past medical history, past surgical history, medications, allergies, family history, and social history were reviewed with the patient. No additional pertinent items.       Review of Systems   Constitutional: Positive for  "activity change. Negative for appetite change, chills, diaphoresis and fever.   HENT: Positive for postnasal drip and rhinorrhea. Negative for tinnitus, trouble swallowing and voice change.    Eyes: Negative for photophobia and visual disturbance.   Respiratory: Positive for cough, chest tightness, shortness of breath and wheezing. Negative for choking.    Cardiovascular: Negative for chest pain and leg swelling.   Gastrointestinal: Negative for abdominal pain, nausea and vomiting.   Genitourinary: Negative for dysuria and flank pain.   Musculoskeletal: Negative for back pain and gait problem.   Skin: Negative for rash.   Allergic/Immunologic: Negative for immunocompromised state.   Neurological: Negative for seizures, syncope, weakness, light-headedness, numbness and headaches.   Hematological: Does not bruise/bleed easily.   Psychiatric/Behavioral: Negative for agitation, confusion, decreased concentration and dysphoric mood. The patient is nervous/anxious.    All other systems reviewed and are negative.    A complete review of systems was performed with pertinent positives and negatives noted in the HPI, and all other systems negative.    Physical Exam   BP: (!) 141/86  Pulse: (!) 126  Temp: 98.8  F (37.1  C)  Resp: 20  Height: 157.5 cm (5' 2\")  Weight: 81.6 kg (180 lb)  SpO2: 96 %  Physical Exam  Vitals and nursing note reviewed.   Constitutional:       General: She is in acute distress.      Appearance: She is well-developed. She is not toxic-appearing or diaphoretic.      Comments: Patient is being no signs is accessory muscle movement noted vital signs otherwise stable mild tachycardia.   HENT:      Head: Normocephalic and atraumatic.      Nose: Congestion present.      Mouth/Throat:      Mouth: Mucous membranes are moist.      Pharynx: Oropharynx is clear.   Eyes:      General: No scleral icterus.     Extraocular Movements: Extraocular movements intact.      Conjunctiva/sclera: Conjunctivae normal.      " Pupils: Pupils are equal, round, and reactive to light.   Cardiovascular:      Rate and Rhythm: Regular rhythm. Tachycardia present.   Pulmonary:      Effort: Respiratory distress present.      Breath sounds: Wheezing present.   Abdominal:      General: There is no distension.      Tenderness: There is no abdominal tenderness. There is no guarding.   Musculoskeletal:         General: No swelling or tenderness.      Cervical back: Normal range of motion and neck supple. No rigidity.      Comments: Negative Homans' sign   Skin:     General: Skin is warm and dry.      Capillary Refill: Capillary refill takes less than 2 seconds.      Coloration: Skin is not pale.      Findings: No erythema or rash.   Neurological:      General: No focal deficit present.      Mental Status: She is alert and oriented to person, place, and time. Mental status is at baseline.   Psychiatric:      Comments: Anxiousness noted.         ED Course           Patient elevated triage patient brought back.  Will start DuoNeb on patient this point.  We will check for other infectious cause etc.    Patient pregnancy test negative otherwise.  Influenza RSV and COVID testing were negative.  Patient received a total of 3 DuoNeb's with improvement here in the ER reassessed wheezing is decreased patient feels much better appears nontoxic he received 40 mg of prednisone orally here in the ER discussed with patient regarding chest x-ray was done also.  No acute infiltrates pneumothorax etc. seen.  This point asthma exacerbation moderate patient was given prescription refill albuterol along with this given prednisone 40 mg daily for 5 days no indication for antibiotics at this point.  Patient will continue using her fluticasone with long-acting salmeterol combination.    Patient agrees with plan comfortable discharge follow-up with MD etc.  Return if any concerns.  Procedures                     Results for orders placed or performed during the hospital  encounter of 06/09/22   XR Chest Port 1 View     Status: None    Narrative    XR CHEST PORT 1 VIEW 6/9/2022 3:38 PM    HISTORY: sob asthma covid labs pending    COMPARISON: 12/3/2021      Impression    IMPRESSION: No infiltrate, pleural effusion or pneumothorax. The  cardiac and mediastinal silhouettes are normal.    PATRICIA OSBORNE MD         SYSTEM ID:  [id number]   Symptomatic; Yes; 6/9/2022 Influenza A/B & SARS-CoV2 (COVID-19) Virus PCR Multiplex Nose     Status: Normal    Specimen: Nose; Swab   Result Value Ref Range    Influenza A PCR Negative Negative    Influenza B PCR Negative Negative    SARS CoV2 PCR Negative Negative    Narrative    Testing was performed using the lena SARS-CoV-2 & Influenza A/B Assay on the lena Alla System. This test should be ordered for the detection of SARS-CoV-2 and influenza viruses in individuals who meet clinical and/or epidemiological criteria. Test performance is unknown in asymptomatic patients. This test is for in vitro diagnostic use under the FDA EUA for laboratories certified under CLIA to perform moderate and/or high complexity testing. This test has not been FDA cleared or approved. A negative result does not rule out the presence of PCR inhibitors in the specimen or target RNA in concentration below the limit of detection for the assay. If only one viral target is positive but coinfection with multiple targets is suspected, the sample should be re-tested with another FDA cleared, approved or authorized test, if coinfection would change clinical management. [facility] are certified under the Clinical Laboratory Improvement Amendments of 1988 (CLIA-88) as  qualified to perform moderate and/or high complexity laboratory testing.   hCG qual urine POCT     Status: Normal   Result Value Ref Range    HCG Qual Urine Negative Negative    Internal QC Check POCT Valid Valid    POCT Kit Lot Number [id number]     POCT Kit Expiration Date 09-      Medications    ipratropium - albuterol 0.5 mg/2.5 mg/3 mL (DUONEB) neb solution 3 mL (3 mLs Nebulization Given 6/9/22 1428)   ipratropium - albuterol 0.5 mg/2.5 mg/3 mL (DUONEB) neb solution 3 mL (3 mLs Nebulization Given 6/9/22 1546)   ipratropium - albuterol 0.5 mg/2.5 mg/3 mL (DUONEB) neb solution 3 mL (3 mLs Nebulization Given 6/9/22 1727)   predniSONE (DELTASONE) tablet 40 mg (40 mg Oral Given 6/9/22 1727)        Assessments & Plan (with Medical Decision Making)    21-year-old female with moderate persistent asthma on long-acting beta agonist with inhaled steroid along with a rescue inhaler noted exacerbation symptoms today she is out of her rescue inhaler no chest pain cardiac symptoms etc. present to the ER short of breath wheezing bowel sounds equal I discussed negative chest x-ray without acute abnormalities COVID RSV and influenza were negative also patient treated with 3 duo nebs along with this 40 mg of prednisone orally patient improved stable comfortably discharged with refill on the albuterol inhaler along with prednisone 40 mg daily for 5 days continue combination medication return if any concerns follow-up with MD patient agrees with plan at this point more likely asthma exacerbation trigger with her viral but no sign of indications for antibiotics       I have reviewed the nursing notes. I have reviewed the findings, diagnosis, plan and need for follow up with the patient.    Discharge Medication List as of 6/9/2022  5:39 PM      START taking these medications    Details   !! albuterol (PROAIR HFA/PROVENTIL HFA/VENTOLIN HFA) 108 (90 Base) MCG/ACT inhaler Inhale 2 puffs into the lungs every 4 hours as needed for shortness of breath / dyspnea or wheezing, Disp-18 g, R-1, Local Print      predniSONE (DELTASONE) 20 MG tablet Take two tablets (= 40mg) each day for 5 (five) days, Disp-10 tablet, R-0, Local Print       !! - Potential duplicate medications found. Please discuss with provider.          Final diagnoses:    Moderate persistent asthma with exacerbation       --  Wilber Russell  [facility] EMERGENCY DEPARTMENT  6/9/2022    This note was created at least in part by the use of dragon voice dictation system. Inadvertent typographical errors may still exist.  Wilber Russell MD.    Patient evaluated in the emergency department during the COVID-19 pandemic period. Careful attention to patients safety was addressed throughout the evaluation. Evaluation and treatment management was initiated with disposition made efficiently and appropriate as possible to minimize any risk of potential exposure to patient during this evaluation.       Wilber Russell MD  06/09/22 2700

## 2022-08-06 ENCOUNTER — HOSPITAL ENCOUNTER (EMERGENCY)
Facility: CLINIC | Age: 22
Discharge: HOME | End: 2022-08-06
Attending: EMERGENCY MEDICINE | Admitting: EMERGENCY MEDICINE
Payer: COMMERCIAL

## 2022-08-06 VITALS
HEART RATE: 75 BPM | SYSTOLIC BLOOD PRESSURE: 136 MMHG | DIASTOLIC BLOOD PRESSURE: 74 MMHG | RESPIRATION RATE: 16 BRPM | OXYGEN SATURATION: 99 % | TEMPERATURE: 98.5 F

## 2022-08-06 DIAGNOSIS — J45.20 MILD INTERMITTENT ASTHMA, UNSPECIFIED WHETHER COMPLICATED: ICD-10-CM

## 2022-08-06 PROCEDURE — 250N000013 HC RX MED GY IP 250 OP 250 PS 637: Performed by: EMERGENCY MEDICINE

## 2022-08-06 PROCEDURE — 99283 EMERGENCY DEPT VISIT LOW MDM: CPT | Performed by: EMERGENCY MEDICINE

## 2022-08-06 PROCEDURE — 99283 EMERGENCY DEPT VISIT LOW MDM: CPT | Mod: 25 | Performed by: EMERGENCY MEDICINE

## 2022-08-06 PROCEDURE — 94640 AIRWAY INHALATION TREATMENT: CPT | Performed by: EMERGENCY MEDICINE

## 2022-08-06 RX ORDER — ALBUTEROL SULFATE 90 UG/1
2 AEROSOL, METERED RESPIRATORY (INHALATION) ONCE
Status: COMPLETED | OUTPATIENT
Start: 2022-08-06 | End: 2022-08-06

## 2022-08-06 RX ORDER — ALBUTEROL SULFATE 90 UG/1
2 AEROSOL, METERED RESPIRATORY (INHALATION) EVERY 4 HOURS PRN
Qty: 18 G | Refills: 1 | Status: SHIPPED | OUTPATIENT
Start: 2022-08-06 | End: 2023-04-03

## 2022-08-06 RX ORDER — FLUTICASONE PROPIONATE AND SALMETEROL 100; 50 UG/1; UG/1
1 POWDER RESPIRATORY (INHALATION) 2 TIMES DAILY
Qty: 1 EACH | Refills: 1 | Status: SHIPPED | OUTPATIENT
Start: 2022-08-06 | End: 2024-08-07

## 2022-08-06 RX ORDER — ALBUTEROL SULFATE 90 UG/1
2 AEROSOL, METERED RESPIRATORY (INHALATION) EVERY 4 HOURS PRN
Qty: 18 G | Refills: 1 | Status: SHIPPED | OUTPATIENT
Start: 2022-08-06 | End: 2022-08-06

## 2022-08-06 RX ADMIN — ALBUTEROL SULFATE 2 PUFF: 90 AEROSOL, METERED RESPIRATORY (INHALATION) at 13:49

## 2022-08-06 RX ADMIN — ALBUTEROL SULFATE 2 PUFF: 90 AEROSOL, METERED RESPIRATORY (INHALATION) at 16:00

## 2022-08-06 ASSESSMENT — ENCOUNTER SYMPTOMS
COUGH: 1
FEVER: 0

## 2022-08-06 NOTE — ED TRIAGE NOTES
Pt reports having asthma attack this am, relief found with rescue inhaler. Pt states she has been unable to refill rescue inhaler and daily maintenance inhaler and is seeking a refill prescription. Pt endorsing slight pressure to  Lungs, but denies respiratory difficulty or SOB.     Triage Assessment     Row Name 08/06/22 [id number]       Triage Assessment (Adult)    Airway WDL WDL       Respiratory WDL    Respiratory WDL WDL       Skin Circulation/Temperature WDL    Skin Circulation/Temperature WDL WDL       Cardiac WDL    Cardiac WDL WDL       Peripheral/Neurovascular WDL    Peripheral Neurovascular WDL WDL       Cognitive/Neuro/Behavioral WDL    Cognitive/Neuro/Behavioral WDL WDL

## 2022-08-06 NOTE — ED PROVIDER NOTES
ED Provider Note  [facility]      History     Chief Complaint   Patient presents with     Medication Refill     Pt reports having asthma attack this am, relief found with rescue inhaler. Pt states she has been unable to refill rescue inhaler and daily maintenance inhaler and is seeking a refill prescription. Pt denies any respiratory symptoms at this  time.        Medication Refill    Katharine Piña is a 21 year old female who presents to the ER due to an asthma attack that she had at work.  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  Patient said that she was in the back storage compartment where it is very hot and there is no air conditioning.  Patient felt like she started to have asthma attack.  Someone brought her an Asthma inhaler which she felt did help with her symptoms.  Patient said at that time noticed that she is out of the refills of both her asthma medications.  She therefore presented to the ER for refill.  Patient says that she is now feeling better now that she had a couple puffs of the albuterol.  Patient notes mild cough.  Denies any fevers.  Denies any productive cough.  Denies any chance of being pregnant.  No previous intubations.  Patient does smoke a few cigarettes a day.      Past Medical History  Past Medical History:   Diagnosis Date     Uncomplicated asthma      No past surgical history on file.  albuterol (PROAIR HFA/PROVENTIL HFA/VENTOLIN HFA) 108 (90 Base) MCG/ACT inhaler  Fexofenadine HCl (ALLEGRA PO)  fluticasone-salmeterol (WIXELA INHUB) 100-50 MCG/ACT inhaler  azithromycin (ZITHROMAX Z-JAMES) 250 MG tablet  predniSONE (DELTASONE) 20 MG tablet      No Known Allergies  Family History  No family history on file.  Social History   Social History     Tobacco Use     Smoking status: Never Smoker     Smokeless tobacco: Never Used   Substance Use Topics     Alcohol use: Not Currently     Drug use: Yes     Types: Marijuana      Past medical history, past surgical  history, medications, allergies, family history, and social history were reviewed with the patient. No additional pertinent items.       Review of Systems   Constitutional: Negative for fever.   Respiratory: Positive for cough (mild, not productive).    All other systems reviewed and are negative.    A complete review of systems was performed with pertinent positives and negatives noted in the HPI, and all other systems negative.    Physical Exam   BP: 109/72  Pulse: 80  Temp: 98.5  F (36.9  C)  Resp: 16  SpO2: 99 %  Physical Exam  Constitutional:       General: She is not in acute distress.     Appearance: She is well-developed. She is not ill-appearing, toxic-appearing or diaphoretic.   HENT:      Head: Normocephalic and atraumatic.   Cardiovascular:      Rate and Rhythm: Normal rate and regular rhythm.      Heart sounds: Normal heart sounds.   Pulmonary:      Effort: Pulmonary effort is normal. No respiratory distress.      Breath sounds: Wheezing (mild wheezes left lower lobe) present.   Abdominal:      General: There is no distension.      Palpations: Abdomen is soft.      Tenderness: There is no abdominal tenderness. There is no rebound.   Musculoskeletal:         General: No tenderness.      Cervical back: Normal range of motion.   Skin:     General: Skin is warm and dry.   Neurological:      General: No focal deficit present.      Mental Status: She is alert and oriented to person, place, and time.   Psychiatric:         Behavior: Behavior normal.         Thought Content: Thought content normal.         ED Course      Procedures       The medical record was reviewed and interpreted.  Current labs reviewed and interpreted.  Previous labs reviewed and interpreted.     No results found for any visits on 08/06/22.  Medications   albuterol (PROVENTIL HFA/VENTOLIN HFA) inhaler (2 puffs Inhalation Given 8/6/22 5799)   albuterol (PROVENTIL HFA/VENTOLIN HFA) inhaler (2 puffs Inhalation Given by Other Clinician 8/6/22  1600)              No results found for any visits on 08/06/22.  Medications   albuterol (PROVENTIL HFA/VENTOLIN HFA) inhaler (has no administration in time range)   albuterol (PROVENTIL HFA/VENTOLIN HFA) inhaler (2 puffs Inhalation Given 8/6/22 1349)        Assessments & Plan (with Medical Decision Making)   Patient presented to the ER due to mild asthma exacerbation.  Patient symptoms mostly resolved by time she was evaluated.  Patient was satting 98% room air and had no conversational dyspnea or increased work of breathing.  Patient will be discharged home with her 2 asthma medications.  She did receive a albuterol inhaler in the ER as well.  Patient educated on importance of smoking cessation.  Patient stable for discharge    I have reviewed the nursing notes. I have reviewed the findings, diagnosis, plan and need for follow up with the patient.    Current Discharge Medication List          Final diagnoses:   Mild intermittent asthma, unspecified whether complicated       --  Alexandra Tejada MD  [facility] EMERGENCY DEPARTMENT  8/6/2022     Alexandra Tejada MD  08/06/22 2006

## 2022-08-06 NOTE — LETTER
August 6, 2022      To Whom It May Concern:      Katharine Piña was seen in our Emergency Department today, 08/06/22.  I expect her condition to improve over the next 2 days.  She may return to work/school when improved.    Sincerely,        Alexandra Tejada MD

## 2022-08-06 NOTE — DISCHARGE INSTRUCTIONS
Use your daily asthma medication.     Please make an appointment to follow up with Your Primary Care Provider in 3-5 days as needed.    Stop smoking.     Return to the ER if symptoms worsen.

## 2022-08-11 ENCOUNTER — HOSPITAL ENCOUNTER (EMERGENCY)
Facility: CLINIC | Age: 22
Discharge: HOME | End: 2022-08-11
Attending: EMERGENCY MEDICINE | Admitting: EMERGENCY MEDICINE
Payer: COMMERCIAL

## 2022-08-11 VITALS
RESPIRATION RATE: 16 BRPM | HEIGHT: 62 IN | BODY MASS INDEX: 35.15 KG/M2 | HEART RATE: 75 BPM | OXYGEN SATURATION: 98 % | WEIGHT: 191 LBS | DIASTOLIC BLOOD PRESSURE: 84 MMHG | TEMPERATURE: 98.6 F | SYSTOLIC BLOOD PRESSURE: 117 MMHG

## 2022-08-11 DIAGNOSIS — H10.11 ALLERGIC CONJUNCTIVITIS, RIGHT: ICD-10-CM

## 2022-08-11 PROCEDURE — 99283 EMERGENCY DEPT VISIT LOW MDM: CPT | Performed by: EMERGENCY MEDICINE

## 2022-08-11 PROCEDURE — 250N000009 HC RX 250: Performed by: EMERGENCY MEDICINE

## 2022-08-11 PROCEDURE — 99284 EMERGENCY DEPT VISIT MOD MDM: CPT | Performed by: EMERGENCY MEDICINE

## 2022-08-11 RX ADMIN — FLUORESCEIN SODIUM 1 STRIP: 1 STRIP OPHTHALMIC at 17:11

## 2022-08-11 ASSESSMENT — ENCOUNTER SYMPTOMS
EYE ITCHING: 1
EYE REDNESS: 0
EYE PAIN: 0

## 2022-08-11 ASSESSMENT — VISUAL ACUITY
OD: 20/25;WITHOUT CORRECTIVE LENSES
OS: 20/25;WITHOUT CORRECTIVE LENSES

## 2022-08-11 ASSESSMENT — ACTIVITIES OF DAILY LIVING (ADL): ADLS_ACUITY_SCORE: 35

## 2022-08-11 NOTE — ED PROVIDER NOTES
[facility] EMERGENCY DEPARTMENT ([facility])       8/11/22  History     Chief Complaint   Patient presents with     Eye Problem     Patient reports new onset eye irritation and swelling on the Right eye     The history is provided by the patient and medical records.     Katharine Piña is a 21 year old female with a past medical history significant for cat allergy who presents to the Emergency Department for evaluation of right eye swelling and irritation.    Patient reports that she normally takes Allegra due to having a history of cat allergies. She states she has not taken her Allegra for the last two days and went to  her cat last night. She says that she was doing well initially but went to lay down and accidentally touched her right eye.  She adds that she began to experience a runny nose along with itchiness in her right eye.  She notes that her right eye became swollen and the skin surrounding her eye was swollen as well.  She says that the swelling has improved since yesterday but is still slightly irritated and swollen in nature.  She adds that she restarted her Allegra today.  Patient denies eye contact use and glasses use.  No known foreign bodies in the eye.  Patient denies trauma to the right eye.  Patient denies visual changes.    Past Medical History:   Diagnosis Date     Uncomplicated asthma        History reviewed. No pertinent surgical history.    History reviewed. No pertinent family history.    Social History     Tobacco Use     Smoking status: Never Smoker     Smokeless tobacco: Never Used   Substance Use Topics     Alcohol use: Not Currently       No current facility-administered medications for this encounter.     Current Outpatient Medications   Medication     albuterol (PROAIR HFA/PROVENTIL HFA/VENTOLIN HFA) 108 (90 Base) MCG/ACT inhaler     Fexofenadine HCl (ALLEGRA PO)     fluticasone-salmeterol (WIXELA INHUB) 100-50 MCG/ACT inhaler     azithromycin (ZITHROMAX Z-JAMES) 250  "MG tablet     predniSONE (DELTASONE) 20 MG tablet      No Known Allergies     I have reviewed the Medications, Allergies, Past Medical and Surgical History, and Social History in the Epic system.    Review of Systems   Eyes: Positive for itching (mild, right eye). Negative for pain, redness and visual disturbance.     A complete review of systems was performed with pertinent positives and negatives noted in the HPI, and all other systems negative.    Physical Exam   BP: 117/84  Pulse: 75  Temp: 98.6  F (37  C)  Resp: 16  Height: 157.5 cm (5' 2\")  Weight: 86.6 kg (191 lb)  SpO2: 98 %      Physical Exam  General: awake, alert, NAD  Head: normal cephalic  HEENT: Mild swelling of the right eyelid.  No redness or warmth.  Conjunctiva are normal-appearing bilaterally.  Pupils are equal round and reactive to light.  Normal visual acuity.  No abnormal fluorescein uptake on Woods lamp exam.  Neck: Supple  CV: regular rate   Lungs: Breathing comfortably on room air  Neuro: awake, answers questions appropriately. No focal deficits noted   Psych: Patient makes good eye contact, pleasant affect.    ED Course     At 4:50 PM the patient was seen and examined by Robb Hodgson MD in Room EDHW06.        Procedures            No results found for this or any previous visit (from the past 24 hour(s)).  Medications   fluorescein (FUL-NICHOL) ophthalmic strip 1 strip (1 strip Both Eyes Given by Other Clinician 8/11/22 1711)             Assessments & Plan (with Medical Decision Making)   Katharine Piña is a 21 year old female who presents with eye irritation after it was rubbing her eye after holding her cat with a known allergy to cats.  She reports symptoms improved after Allegra.  Today she is well-appearing, reports her symptoms have improved, her visual acuity is normal and there is no sign of corneal ulcer or abrasion on fluorescein exam and no foreign body appreciated.  Recommend she continue her Allegra, can take Benadryl at night " if she needs some additional antihistamine and I recommended cold compresses for swelling.    I have reviewed the nursing notes.    I have reviewed the findings, diagnosis, plan and need for follow up with the patient.    Discharge Medication List as of 8/11/2022  5:07 PM          Final diagnoses:   Allergic conjunctivitis, right       I, Natividad Harrison am serving as a trained medical scribe to document services personally performed by Robb Reyes MD, based on the provider's statements to me.      I, Robb Reyes MD, was physically present and have reviewed and verified the accuracy of this note documented by Natividad Harrison.     Robb Reyes MD  8/11/2022   [facility] EMERGENCY DEPARTMENT     Robb Reyes MD  08/11/22 9378

## 2022-08-11 NOTE — ED TRIAGE NOTES
Patient reports she hasn't taken her allergy meds for a couple days and last night she was scratching her Right eye and en she woke up this morning it was swollen.     Triage Assessment     Row Name 08/11/22 [id number]       Triage Assessment (Adult)    Airway WDL WDL       Respiratory WDL    Respiratory WDL WDL       Skin Circulation/Temperature WDL    Skin Circulation/Temperature WDL WDL       Cardiac WDL    Cardiac WDL WDL       Peripheral/Neurovascular WDL    Peripheral Neurovascular WDL WDL

## 2022-08-11 NOTE — DISCHARGE INSTRUCTIONS
Take benadryl for eye swelling and itching in addition to the daily allegra. Can also apply cold compresses to the eye.

## 2022-08-11 NOTE — LETTER
August 11, 2022      To Whom It May Concern:      Katharine Piña was seen in our Emergency Department today, 08/11/22.  I expect her condition to improve.  She may return to work/school when improved.    Sincerely,        Robb Reyes MD

## 2022-10-09 ENCOUNTER — HOSPITAL ENCOUNTER (EMERGENCY)
Facility: CLINIC | Age: 22
Discharge: HOME | End: 2022-10-10
Attending: EMERGENCY MEDICINE | Admitting: EMERGENCY MEDICINE
Payer: COMMERCIAL

## 2022-10-09 ENCOUNTER — APPOINTMENT (OUTPATIENT)
Dept: GENERAL RADIOLOGY | Facility: CLINIC | Age: 22
End: 2022-10-09
Attending: EMERGENCY MEDICINE
Payer: COMMERCIAL

## 2022-10-09 DIAGNOSIS — J18.9 PNEUMONIA OF LEFT LOWER LOBE DUE TO INFECTIOUS ORGANISM: ICD-10-CM

## 2022-10-09 DIAGNOSIS — J45.901 MODERATE ASTHMA WITH ACUTE EXACERBATION, UNSPECIFIED WHETHER PERSISTENT: ICD-10-CM

## 2022-10-09 LAB
FLUAV RNA SPEC QL NAA+PROBE: NEGATIVE
FLUBV RNA RESP QL NAA+PROBE: NEGATIVE
RSV RNA SPEC NAA+PROBE: NEGATIVE
SARS-COV-2 RNA RESP QL NAA+PROBE: NEGATIVE

## 2022-10-09 PROCEDURE — 87637 SARSCOV2&INF A&B&RSV AMP PRB: CPT | Performed by: FAMILY MEDICINE

## 2022-10-09 PROCEDURE — 99284 EMERGENCY DEPT VISIT MOD MDM: CPT | Mod: CS,25 | Performed by: EMERGENCY MEDICINE

## 2022-10-09 PROCEDURE — 99284 EMERGENCY DEPT VISIT MOD MDM: CPT | Mod: CS | Performed by: EMERGENCY MEDICINE

## 2022-10-09 PROCEDURE — 71046 X-RAY EXAM CHEST 2 VIEWS: CPT

## 2022-10-09 PROCEDURE — 94640 AIRWAY INHALATION TREATMENT: CPT | Performed by: EMERGENCY MEDICINE

## 2022-10-09 PROCEDURE — 250N000012 HC RX MED GY IP 250 OP 636 PS 637: Performed by: EMERGENCY MEDICINE

## 2022-10-09 PROCEDURE — C9803 HOPD COVID-19 SPEC COLLECT: HCPCS | Performed by: EMERGENCY MEDICINE

## 2022-10-09 PROCEDURE — 250N000009 HC RX 250: Performed by: EMERGENCY MEDICINE

## 2022-10-09 RX ORDER — PREDNISONE 20 MG/1
40 TABLET ORAL ONCE
Status: COMPLETED | OUTPATIENT
Start: 2022-10-09 | End: 2022-10-09

## 2022-10-09 RX ORDER — ALBUTEROL SULFATE 0.83 MG/ML
2.5 SOLUTION RESPIRATORY (INHALATION)
Status: DISCONTINUED | OUTPATIENT
Start: 2022-10-09 | End: 2022-10-10 | Stop reason: HOSPADM

## 2022-10-09 RX ORDER — IPRATROPIUM BROMIDE AND ALBUTEROL SULFATE 2.5; .5 MG/3ML; MG/3ML
3 SOLUTION RESPIRATORY (INHALATION) ONCE
Status: COMPLETED | OUTPATIENT
Start: 2022-10-09 | End: 2022-10-09

## 2022-10-09 RX ADMIN — PREDNISONE 40 MG: 20 TABLET ORAL at 23:24

## 2022-10-09 RX ADMIN — IPRATROPIUM BROMIDE AND ALBUTEROL SULFATE 3 ML: .5; 3 SOLUTION RESPIRATORY (INHALATION) at 23:24

## 2022-10-09 RX ADMIN — ALBUTEROL SULFATE 2.5 MG: 2.5 SOLUTION RESPIRATORY (INHALATION) at 23:15

## 2022-10-09 ASSESSMENT — ACTIVITIES OF DAILY LIVING (ADL): ADLS_ACUITY_SCORE: 33

## 2022-10-09 NOTE — LETTER
October 10, 2022      To Whom It May Concern:      Katharine Piña was seen in our Emergency Department today, 10/10/22.  I expect her condition to improve over the next 3 days.  She may return to work/school when improved.    Sincerely,        Alexandra Tejada MD

## 2022-10-10 VITALS
BODY MASS INDEX: 34.96 KG/M2 | RESPIRATION RATE: 16 BRPM | SYSTOLIC BLOOD PRESSURE: 120 MMHG | TEMPERATURE: 98.4 F | HEIGHT: 62 IN | HEART RATE: 105 BPM | OXYGEN SATURATION: 98 % | DIASTOLIC BLOOD PRESSURE: 84 MMHG | WEIGHT: 190 LBS

## 2022-10-10 PROCEDURE — 250N000013 HC RX MED GY IP 250 OP 250 PS 637: Performed by: EMERGENCY MEDICINE

## 2022-10-10 RX ORDER — PREDNISONE 20 MG/1
TABLET ORAL
Qty: 10 TABLET | Refills: 0 | Status: SHIPPED | OUTPATIENT
Start: 2022-10-10 | End: 2023-04-03

## 2022-10-10 RX ORDER — LEVOFLOXACIN 500 MG/1
500 TABLET, FILM COATED ORAL DAILY
Qty: 4 TABLET | Refills: 0 | Status: SHIPPED | OUTPATIENT
Start: 2022-10-10 | End: 2022-10-14

## 2022-10-10 RX ORDER — LEVOFLOXACIN 500 MG/1
500 TABLET, FILM COATED ORAL ONCE
Status: COMPLETED | OUTPATIENT
Start: 2022-10-10 | End: 2022-10-10

## 2022-10-10 RX ADMIN — LEVOFLOXACIN 500 MG: 500 TABLET, FILM COATED ORAL at 00:14

## 2022-10-10 ASSESSMENT — ENCOUNTER SYMPTOMS
COUGH: 1
WHEEZING: 1
SHORTNESS OF BREATH: 0
ABDOMINAL PAIN: 0
FATIGUE: 0
FEVER: 0
CHILLS: 0

## 2022-10-10 NOTE — DISCHARGE INSTRUCTIONS
You have a pneumonia that is causing you to have worse breathing.     Take the antibiotics as prescribed.     Take the prednisone as prescribed for the asthma.    Please make an appointment to follow up with Your Primary Care Provider in 3-5 days even if entirely better.    Return to the ER if any worsening of problems.

## 2022-10-10 NOTE — ED PROVIDER NOTES
[facility] EMERGENCY DEPARTMENT ([facility])     October 9, 2022    History     Chief Complaint   Patient presents with     Covid Concern     Shortness of Breath     Patient reports shortness of breath, chills, cough, wheezing, and runny nose. Wheezing and shortness of breath not relieved by patient's nebulizer     HPI  Katharine Piña is a 21 year old female with a past medical history significant for asthma and CAMRYN who presents to the Emergency Department for evaluation of shortness of breath. Patient reports wheezing, phlegm, coughing, runny nose, chest pain, and back pain. Patient states symptoms started last night but got worsen today. Patient states she used her nebulizer but it did not relief her. Patient states she only got one dose of the Covid vaccine. Patient states she smokes marijuana once a day. Patient denies any chances of pregnancy. Patient denies fever.     Past Medical History  Past Medical History:   Diagnosis Date     Uncomplicated asthma      No past surgical history on file.  albuterol (PROAIR HFA/PROVENTIL HFA/VENTOLIN HFA) 108 (90 Base) MCG/ACT inhaler  fluticasone-salmeterol (WIXELA INHUB) 100-50 MCG/ACT inhaler  azithromycin (ZITHROMAX Z-JAMES) 250 MG tablet  Fexofenadine HCl (ALLEGRA PO)  predniSONE (DELTASONE) 20 MG tablet      No Known Allergies  Family History  No family history on file.  Social History   Social History     Tobacco Use     Smoking status: Never     Smokeless tobacco: Never   Substance Use Topics     Alcohol use: Not Currently     Drug use: Yes     Types: Marijuana      Past medical history, past surgical history, medications, allergies, family history, and social history were reviewed with the patient. No additional pertinent items.       Review of Systems   Constitutional: Negative for chills, fatigue and fever.   Respiratory: Positive for cough and wheezing. Negative for shortness of breath.    Cardiovascular: Negative for chest pain.   Gastrointestinal:  "Negative for abdominal pain.   All other systems reviewed and are negative.    A complete review of systems was performed with pertinent positives and negatives noted in the HPI, and all other systems negative.    Physical Exam   BP: 120/84  Pulse: 105  Temp: 98.4  F (36.9  C)  Resp: 14  Height: 157.5 cm (5' 2\")  Weight: 86.2 kg (190 lb)  SpO2: 98 %  Physical Exam  Constitutional:       General: She is not in acute distress.     Appearance: She is well-developed and well-nourished. She is not ill-appearing, toxic-appearing or diaphoretic.   HENT:      Head: Normocephalic and atraumatic.   Cardiovascular:      Rate and Rhythm: Normal rate and regular rhythm.      Heart sounds: Normal heart sounds.   Pulmonary:      Effort: Pulmonary effort is normal. No respiratory distress.      Breath sounds: Examination of the left-upper field reveals wheezing and rhonchi. Examination of the left-middle field reveals wheezing and rhonchi. Wheezing and rhonchi present.   Abdominal:      General: There is no distension.      Palpations: Abdomen is soft.      Tenderness: There is no abdominal tenderness. There is no rebound.   Musculoskeletal:         General: No tenderness.      Cervical back: Normal range of motion.   Skin:     General: Skin is warm and dry.   Neurological:      Mental Status: She is alert and oriented to person, place, and time.   Psychiatric:         Mood and Affect: Mood and affect normal.         Behavior: Behavior normal.         Thought Content: Thought content normal.         ED Course      Procedures        10:51 PM  The patient was seen and examined by Alexandra Tejada MD in Room HW03.   The medical record was reviewed and interpreted.  Current images reviewed and interpreted: CXR-early left side infiltrate.              Results for orders placed or performed during the hospital encounter of 10/09/22   Symptomatic; Yes; 10/7/2022 Influenza A/B & SARS-CoV2 (COVID-19) Virus PCR Multiplex Nasopharyngeal "     Status: Normal    Specimen: Nasopharyngeal; Swab   Result Value Ref Range    Influenza A PCR Negative Negative    Influenza B PCR Negative Negative    RSV PCR Negative Negative    SARS CoV2 PCR Negative Negative    Narrative    Testing was performed using the Xpert Xpress CoV2/Flu/RSV Assay on the [employer] GeneXpert Instrument. This test should be ordered for the detection of SARS-CoV-2 and influenza viruses in individuals who meet clinical and/or epidemiological criteria. Test performance is unknown in asymptomatic patients. This test is for in vitro diagnostic use under the FDA EUA for laboratories certified under CLIA to perform high or moderate complexity testing. This test has not been FDA cleared or approved. A negative result does not rule out the presence of PCR inhibitors in the specimen or target RNA in concentration below the limit of detection for the assay. If only one viral target is positive but coinfection with multiple targets is suspected, the sample should be re-tested with another FDA cleared, approved, or authorized test, if coinfection would change clinical management. This test was validated by the [facility]. These laboratories are certified under the Clinical Laboratory Improvement Amendments of 1988 (CLIA-88) as qualified to perform high complexity laboratory testing.     Medications - No data to display     Assessments & Plan (with Medical Decision Making)   Patient is a 21-year-old female with a known history of asthma who presents to the ER due to 2 days of having worse asthma having a productive cough and some chills.  No fever.  No severe shortness of breath.  Patient did have some wheezing and some rhonchi in the left side.  This is consistent with a pneumonia that I am seeing a chest x-ray.  Patient's vital signs are stable.  She is afebrile.  She not in any respiratory distress.  Patient's COVID and influenza are negative.  Patient received some nebs and is  feeling better.  Plan will be to discharge home with a course of Levaquin and prednisone.  Patient agrees with plan of care.  Patient stable for discharge.    I have reviewed the nursing notes. I have reviewed the findings, diagnosis, plan and need for follow up with the patient.    New Prescriptions    No medications on file       Final diagnoses:   Moderate asthma with acute exacerbation, unspecified whether persistent   Pneumonia of left lower lobe due to infectious organism   I, Irene Bauman, am serving as a trained medical scribe to document services personally performed by Alexandra Tejada MD, based on the provider's statements to me.      IAlexandra MD, was physically present and have reviewed and verified the accuracy of this note documented by Irene Bauman.     --  Alexandra Tejada MD  [facility] EMERGENCY DEPARTMENT  10/9/2022     Alexandra Tejada MD  10/10/22 0036

## 2022-10-10 NOTE — ED TRIAGE NOTES
Triage Assessment     Row Name 10/09/22 2123       Triage Assessment (Adult)    Airway WDL WDL       Respiratory WDL    Respiratory WDL X;cough    Cough Frequency frequent    Cough Type productive       Skin Circulation/Temperature WDL    Skin Circulation/Temperature WDL WDL       Cardiac WDL    Cardiac WDL X;rhythm    Pulse Rate & Regularity tachycardic       Peripheral/Neurovascular WDL    Peripheral Neurovascular WDL WDL       Cognitive/Neuro/Behavioral WDL    Cognitive/Neuro/Behavioral WDL WDL

## 2022-10-10 NOTE — ED TRIAGE NOTES
Patient reports wheezing, phlegm, coughing, and runny nose. Patient reports she did not get any relief from her nebulizer and that she feels short of breath.

## 2022-12-12 ENCOUNTER — HOSPITAL ENCOUNTER (EMERGENCY)
Facility: CLINIC | Age: 22
Discharge: HOME | End: 2022-12-12
Attending: EMERGENCY MEDICINE | Admitting: EMERGENCY MEDICINE
Payer: COMMERCIAL

## 2022-12-12 VITALS
WEIGHT: 187 LBS | RESPIRATION RATE: 20 BRPM | DIASTOLIC BLOOD PRESSURE: 84 MMHG | HEIGHT: 62 IN | HEART RATE: 105 BPM | OXYGEN SATURATION: 98 % | BODY MASS INDEX: 34.41 KG/M2 | SYSTOLIC BLOOD PRESSURE: 140 MMHG | TEMPERATURE: 98.4 F

## 2022-12-12 DIAGNOSIS — J45.901 MILD ASTHMA WITH EXACERBATION, UNSPECIFIED WHETHER PERSISTENT: ICD-10-CM

## 2022-12-12 LAB
FLUAV RNA SPEC QL NAA+PROBE: NEGATIVE
FLUBV RNA RESP QL NAA+PROBE: NEGATIVE
HCG UR QL: NEGATIVE
INTERNAL QC OK POCT: NORMAL
POCT KIT EXPIRATION DATE: NORMAL
POCT KIT LOT NUMBER: NORMAL
RSV RNA SPEC NAA+PROBE: NEGATIVE
SARS-COV-2 RNA RESP QL NAA+PROBE: NEGATIVE

## 2022-12-12 PROCEDURE — 99283 EMERGENCY DEPT VISIT LOW MDM: CPT | Mod: CS,25 | Performed by: EMERGENCY MEDICINE

## 2022-12-12 PROCEDURE — 99283 EMERGENCY DEPT VISIT LOW MDM: CPT | Mod: CS | Performed by: EMERGENCY MEDICINE

## 2022-12-12 PROCEDURE — 81025 URINE PREGNANCY TEST: CPT | Performed by: EMERGENCY MEDICINE

## 2022-12-12 PROCEDURE — 87637 SARSCOV2&INF A&B&RSV AMP PRB: CPT | Performed by: EMERGENCY MEDICINE

## 2022-12-12 PROCEDURE — 250N000012 HC RX MED GY IP 250 OP 636 PS 637: Performed by: EMERGENCY MEDICINE

## 2022-12-12 PROCEDURE — C9803 HOPD COVID-19 SPEC COLLECT: HCPCS | Performed by: EMERGENCY MEDICINE

## 2022-12-12 PROCEDURE — 250N000009 HC RX 250: Performed by: EMERGENCY MEDICINE

## 2022-12-12 PROCEDURE — 250N000013 HC RX MED GY IP 250 OP 250 PS 637: Performed by: EMERGENCY MEDICINE

## 2022-12-12 PROCEDURE — 94640 AIRWAY INHALATION TREATMENT: CPT | Performed by: EMERGENCY MEDICINE

## 2022-12-12 PROCEDURE — 999N000105 HC STATISTIC NO DOCUMENTATION TO SUPPORT CHARGE

## 2022-12-12 RX ORDER — ALBUTEROL SULFATE 90 UG/1
6 AEROSOL, METERED RESPIRATORY (INHALATION) ONCE
Status: COMPLETED | OUTPATIENT
Start: 2022-12-12 | End: 2022-12-12

## 2022-12-12 RX ORDER — PREDNISONE 20 MG/1
40 TABLET ORAL ONCE
Status: COMPLETED | OUTPATIENT
Start: 2022-12-12 | End: 2022-12-12

## 2022-12-12 RX ORDER — PREDNISONE 20 MG/1
TABLET ORAL
Qty: 10 TABLET | Refills: 0 | Status: SHIPPED | OUTPATIENT
Start: 2022-12-12 | End: 2023-04-03

## 2022-12-12 RX ORDER — IPRATROPIUM BROMIDE AND ALBUTEROL SULFATE 2.5; .5 MG/3ML; MG/3ML
3 SOLUTION RESPIRATORY (INHALATION) ONCE
Status: COMPLETED | OUTPATIENT
Start: 2022-12-12 | End: 2022-12-12

## 2022-12-12 RX ADMIN — PREDNISONE 40 MG: 20 TABLET ORAL at 19:22

## 2022-12-12 RX ADMIN — IPRATROPIUM BROMIDE AND ALBUTEROL SULFATE 3 ML: 2.5; .5 SOLUTION RESPIRATORY (INHALATION) at 19:23

## 2022-12-12 RX ADMIN — ALBUTEROL SULFATE 6 PUFF: 90 AEROSOL, METERED RESPIRATORY (INHALATION) at 18:04

## 2022-12-12 ASSESSMENT — ACTIVITIES OF DAILY LIVING (ADL): ADLS_ACUITY_SCORE: 33

## 2022-12-12 ASSESSMENT — ENCOUNTER SYMPTOMS
SHORTNESS OF BREATH: 1
RHINORRHEA: 1
COUGH: 1

## 2022-12-12 NOTE — ED TRIAGE NOTES
Patient states that since Thursday she has been having a runny nose, cough (productive), states her breathing is harder.      Triage Assessment     Row Name 12/12/22 5929       Triage Assessment (Adult)    Airway WDL WDL       Respiratory WDL    Respiratory WDL X;cough    Cough Frequency frequent    Cough Type productive       Skin Circulation/Temperature WDL    Skin Circulation/Temperature WDL WDL       Cardiac WDL    Cardiac WDL WDL       Peripheral/Neurovascular WDL    Peripheral Neurovascular WDL WDL       Cognitive/Neuro/Behavioral WDL    Cognitive/Neuro/Behavioral WDL WDL

## 2022-12-13 NOTE — ED PROVIDER NOTES
[facility] EMERGENCY DEPARTMENT ([facility])    12/12/22      ED PROVIDER NOTE   ED 6        History     Chief Complaint   Patient presents with     Cough     Patient states that she has had a cough, shortness of breathe (with asthma), and runny nose since Thursday. Patient denies taking a covid test.      HPI  Katharine Piña is a 22 year old female with history of asthma, generalized anxiety disorder who presents to the emergency department with cough, shortness of breath, rhinorrhea for the past 4 days.  She has not taken a COVID test yet.    Per [facility] records, patient has had 2 doses of the Pfizer COVID-19 vaccine, she did get a flu shot this year.  For her asthma she is on albuterol inhaler, Wixela inhaler, Allegra.  She has been on prednisone in the past.  No history of intubations for asthma.    Past Medical History  Past Medical History:   Diagnosis Date     Uncomplicated asthma      No past surgical history on file.  albuterol (PROAIR HFA/PROVENTIL HFA/VENTOLIN HFA) 108 (90 Base) MCG/ACT inhaler  azithromycin (ZITHROMAX Z-JAMES) 250 MG tablet  Fexofenadine HCl (ALLEGRA PO)  fluticasone-salmeterol (WIXELA INHUB) 100-50 MCG/ACT inhaler  predniSONE (DELTASONE) 20 MG tablet      No Known Allergies  Family History  No family history on file.  Social History   Social History     Tobacco Use     Smoking status: Never     Smokeless tobacco: Never   Substance Use Topics     Alcohol use: Not Currently     Drug use: Yes     Types: Marijuana      Past medical history, past surgical history, medications, allergies, family history, and social history were reviewed with the patient. No additional pertinent items.       Review of Systems   HENT: Positive for rhinorrhea.    Respiratory: Positive for cough and shortness of breath.      A complete review of systems was performed with pertinent positives and negatives noted in the HPI, and all other systems negative.    Physical Exam   BP: 134/80  Pulse: 117  Temp: 98.4  " F (36.9  C)  Resp: 18  Height: 157.5 cm (5' 2\")  Weight: 84.8 kg (187 lb)  SpO2: 96 %  Physical Exam  Constitutional:       General: She is not in acute distress.     Appearance: She is not diaphoretic.   HENT:      Head: Atraumatic.      Mouth/Throat:      Pharynx: No oropharyngeal exudate.   Eyes:      General: No scleral icterus.     Pupils: Pupils are equal, round, and reactive to light.   Cardiovascular:      Heart sounds: Normal heart sounds.   Pulmonary:      Effort: No respiratory distress.      Breath sounds: Normal breath sounds.   Abdominal:      General: Bowel sounds are normal.      Palpations: Abdomen is soft.      Tenderness: There is no abdominal tenderness.   Musculoskeletal:         General: No tenderness.   Skin:     General: Skin is warm.      Findings: No rash.           ED Course      Procedures                 Results for orders placed or performed during the hospital encounter of 12/12/22   Symptomatic Influenza A/B & SARS-CoV2 (COVID-19) Virus PCR Multiplex Nasopharyngeal     Status: Normal    Specimen: Nasopharyngeal; Swab   Result Value Ref Range    Influenza A PCR Negative Negative    Influenza B PCR Negative Negative    RSV PCR Negative Negative    SARS CoV2 PCR Negative Negative    Narrative    Testing was performed using the Xpert Xpress CoV2/Flu/RSV Assay on the [employer] GeneXpert Instrument. This test should be ordered for the detection of SARS-CoV-2 and influenza viruses in individuals who meet clinical and/or epidemiological criteria. Test performance is unknown in asymptomatic patients. This test is for in vitro diagnostic use under the FDA EUA for laboratories certified under CLIA to perform high or moderate complexity testing. This test has not been FDA cleared or approved. A negative result does not rule out the presence of PCR inhibitors in the specimen or target RNA in concentration below the limit of detection for the assay. If only one viral target is positive but " coinfection with multiple targets is suspected, the sample should be re-tested with another FDA cleared, approved, or authorized test, if coinfection would change clinical management. This test was validated by the [facility]. These laboratories are certified under the Clinical Laboratory Improvement Amendments of 1988 (CLIA-88) as qualified to perform high complexity laboratory testing.   hCG qual urine POCT     Status: Normal   Result Value Ref Range    HCG Qual Urine Negative Negative    Internal QC Check POCT Valid Valid    POCT Kit Lot Number [id number]     POCT Kit Expiration Date 11/30/2023      Medications   albuterol (PROVENTIL HFA/VENTOLIN HFA) inhaler (6 puffs Inhalation Given 12/12/22 1804)        Assessments & Plan (with Medical Decision Making)     22 year old female with history of asthma, generalized anxiety disorder who presents to the emergency department with cough, shortness of breath, rhinorrhea for the past 4 days.  Vital signs stable afebrile in triage including normal pulse ox at 90% on room air.  COVID influenza swabs negative.  Patient given duo nebs and prednisone here in the emergency department upon repeat assessment patient's symptoms have completely resolved.  Plan to discharge home with prescription for prednisone and follow-up with primary care provider for further evaluation care.    I have reviewed the nursing notes. I have reviewed the findings, diagnosis, plan and need for follow up with the patient.    New Prescriptions    No medications on file       Final diagnoses:   Mild asthma with exacerbation, unspecified whether persistent       --  Isai Greene MD   [facility] EMERGENCY DEPARTMENT  12/12/2022     Isai Greene MD  12/16/22 0045

## 2023-01-21 ENCOUNTER — HOSPITAL ENCOUNTER (EMERGENCY)
Facility: CLINIC | Age: 23
Discharge: HOME | End: 2023-01-21
Attending: EMERGENCY MEDICINE | Admitting: EMERGENCY MEDICINE
Payer: COMMERCIAL

## 2023-01-21 VITALS
HEART RATE: 95 BPM | TEMPERATURE: 98.7 F | DIASTOLIC BLOOD PRESSURE: 81 MMHG | OXYGEN SATURATION: 99 % | SYSTOLIC BLOOD PRESSURE: 119 MMHG | RESPIRATION RATE: 16 BRPM

## 2023-01-21 DIAGNOSIS — J02.0 ACUTE STREPTOCOCCAL PHARYNGITIS: ICD-10-CM

## 2023-01-21 LAB
DEPRECATED S PYO AG THROAT QL EIA: POSITIVE
FLUAV RNA SPEC QL NAA+PROBE: NEGATIVE
FLUBV RNA RESP QL NAA+PROBE: NEGATIVE
RSV RNA SPEC NAA+PROBE: NEGATIVE
SARS-COV-2 RNA RESP QL NAA+PROBE: NEGATIVE

## 2023-01-21 PROCEDURE — C9803 HOPD COVID-19 SPEC COLLECT: HCPCS | Performed by: EMERGENCY MEDICINE

## 2023-01-21 PROCEDURE — 99284 EMERGENCY DEPT VISIT MOD MDM: CPT | Mod: CS | Performed by: EMERGENCY MEDICINE

## 2023-01-21 PROCEDURE — 87637 SARSCOV2&INF A&B&RSV AMP PRB: CPT | Performed by: EMERGENCY MEDICINE

## 2023-01-21 PROCEDURE — 99283 EMERGENCY DEPT VISIT LOW MDM: CPT | Mod: CS | Performed by: EMERGENCY MEDICINE

## 2023-01-21 PROCEDURE — 87880 STREP A ASSAY W/OPTIC: CPT | Performed by: EMERGENCY MEDICINE

## 2023-01-21 PROCEDURE — 250N000013 HC RX MED GY IP 250 OP 250 PS 637: Performed by: EMERGENCY MEDICINE

## 2023-01-21 RX ORDER — AMOXICILLIN 250 MG/1
500 CAPSULE ORAL ONCE
Status: COMPLETED | OUTPATIENT
Start: 2023-01-21 | End: 2023-01-21

## 2023-01-21 RX ORDER — AMOXICILLIN 500 MG/1
500 CAPSULE ORAL 3 TIMES DAILY
Qty: 30 CAPSULE | Refills: 0 | Status: SHIPPED | OUTPATIENT
Start: 2023-01-21 | End: 2023-01-31

## 2023-01-21 RX ADMIN — AMOXICILLIN 500 MG: 250 CAPSULE ORAL at 20:41

## 2023-01-22 NOTE — ED PROVIDER NOTES
History     Chief Complaint   Patient presents with     Pharyngitis     HPI  Katharine Piña is a 22 year old female who presents to the [facility] part with 24 hours worth of a sore throat.  Patient denies any cough denies any earache denies any vomiting or diarrhea.    I have reviewed the Medications, Allergies, Past Medical and Surgical History, and Social History in the [facility] system.    Past Medical History:   Diagnosis Date     Uncomplicated asthma        No past surgical history on file.           Dose / Directions   albuterol 108 (90 Base) MCG/ACT inhaler  Commonly known as: PROAIR HFA/PROVENTIL HFA/VENTOLIN HFA      Dose: 2 puff  Inhale 2 puffs into the lungs every 4 hours as needed for shortness of breath / dyspnea or wheezing  Quantity: 18 g  Refills: 1     ALLEGRA PO      Refills: 0     azithromycin 250 MG tablet  Commonly known as: Zithromax Z-Deng      As directed  Quantity: 6 tablet  Refills: 0     fluticasone-salmeterol 100-50 MCG/ACT inhaler  Commonly known as: Wixela Inhub      Dose: 1 puff  Inhale 1 puff into the lungs 2 times daily for 30 days  Quantity: 1 each  Refills: 1     * predniSONE 20 MG tablet  Commonly known as: DELTASONE      Take two tablets (= 40mg) each day for 5 (five) days  Quantity: 10 tablet  Refills: 0     * predniSONE 20 MG tablet  Commonly known as: DELTASONE      Take two tablets (= 40mg) each day for 5 (five) days  Quantity: 10 tablet  Refills: 0         * This list has 2 medication(s) that are the same as other medications prescribed for you. Read the directions carefully, and ask your doctor or other care provider to review them with you.                    Past medical history, past surgical history, medications, and allergies were reviewed with the patient. Additional pertinent items: None    No family history on file.    Social History     Tobacco Use     Smoking status: Never     Smokeless tobacco: Never   Substance Use Topics     Alcohol use: Not Currently     Social  history was reviewed with the patient. Additional pertinent items: None    No Known Allergies    Review of Systems  A medically appropriate review of systems was performed with pertinent positives and negatives noted in the HPI, and all other systems negative. and A complete review of systems was performed with pertinent positives and negatives noted in the HPI, and all other systems negative.    Physical Exam   BP: 119/81  Pulse: 95  Temp: 98.7  F (37.1  C)  Resp: 16  SpO2: 99 %      Physical Exam  Vitals and nursing note reviewed.   Constitutional:       Appearance: She is not ill-appearing, toxic-appearing or diaphoretic.   HENT:      Head: Atraumatic.      Mouth/Throat:      Pharynx: Uvula midline. Posterior oropharyngeal erythema present. No pharyngeal swelling.      Tonsils: Tonsillar exudate present.   Eyes:      Pupils: Pupils are equal, round, and reactive to light.   Pulmonary:      Effort: No respiratory distress.   Musculoskeletal:      Cervical back: Neck supple.   Neurological:      General: No focal deficit present.      Mental Status: She is oriented to person, place, and time.         ED Course        Procedures             Results for orders placed or performed during the hospital encounter of 01/21/23 (from the past 24 hour(s))   Symptomatic Influenza A/B & SARS-CoV2 (COVID-19) Virus PCR Multiplex Nasopharyngeal    Specimen: Nasopharyngeal; Swab   Result Value Ref Range    Influenza A PCR Negative Negative    Influenza B PCR Negative Negative    RSV PCR Negative Negative    SARS CoV2 PCR Negative Negative    Narrative    Testing was performed using the Xpert Xpress CoV2/Flu/RSV Assay on the [employer] GeneXpert Instrument. This test should be ordered for the detection of SARS-CoV-2 and influenza viruses in individuals who meet clinical and/or epidemiological criteria. Test performance is unknown in asymptomatic patients. This test is for in vitro diagnostic use under the FDA EUA for laboratories  certified under CLIA to perform high or moderate complexity testing. This test has not been FDA cleared or approved. A negative result does not rule out the presence of PCR inhibitors in the specimen or target RNA in concentration below the limit of detection for the assay. If only one viral target is positive but coinfection with multiple targets is suspected, the sample should be re-tested with another FDA cleared, approved, or authorized test, if coinfection would change clinical management. This test was validated by the [facility]. These laboratories are certified under the Clinical Laboratory Improvement Amendments of 1988 (CLIA-88) as qualified to perform high complexity laboratory testing.   Streptococcus A Rapid Screen w/Reflex to PCR    Specimen: Throat; Swab   Result Value Ref Range    Group A Strep antigen Positive (A) Negative       Medications   amoxicillin (AMOXIL) capsule 500 mg (has no administration in time range)         Assessments & Plan (with Medical Decision Making)     I have reviewed the nursing notes.    Admit will be initiated and patient will be discharged home    I have reviewed the findings, diagnosis, plan and need for follow up with the patient.    New Prescriptions    AMOXICILLIN (AMOXIL) 500 MG CAPSULE    Take 1 capsule (500 mg) by mouth 3 times daily for 10 days       Final diagnoses:   Acute streptococcal pharyngitis     Fill your prescription and take as directed.    Please make an appointment to follow up with Your Primary Care Provider or our [facility] (phone: [phone]) in 5 days if not improving.    Routine discharge instructions were given for this diagnosis    ELAINA HURST MD    1/21/2023   [facility] EMERGENCY DEPARTMENT     Elaina Hurst MD  01/21/23 2040

## 2023-01-22 NOTE — DISCHARGE INSTRUCTIONS
Home tonight to rest.    Fill your prescription and take as directed.    Please make an appointment to follow up with Your Primary Care Provider or our [facility] (phone: [phone]) in 5 days if not improving.

## 2023-04-03 ENCOUNTER — HOSPITAL ENCOUNTER (EMERGENCY)
Facility: CLINIC | Age: 23
Discharge: HOME | End: 2023-04-03
Attending: EMERGENCY MEDICINE | Admitting: EMERGENCY MEDICINE
Payer: COMMERCIAL

## 2023-04-03 ENCOUNTER — APPOINTMENT (OUTPATIENT)
Dept: GENERAL RADIOLOGY | Facility: CLINIC | Age: 23
End: 2023-04-03
Attending: EMERGENCY MEDICINE
Payer: COMMERCIAL

## 2023-04-03 VITALS
SYSTOLIC BLOOD PRESSURE: 137 MMHG | OXYGEN SATURATION: 98 % | BODY MASS INDEX: 34.78 KG/M2 | DIASTOLIC BLOOD PRESSURE: 87 MMHG | HEIGHT: 62 IN | WEIGHT: 189 LBS | HEART RATE: 86 BPM | TEMPERATURE: 98.2 F | RESPIRATION RATE: 18 BRPM

## 2023-04-03 DIAGNOSIS — J06.9 VIRAL URI: ICD-10-CM

## 2023-04-03 DIAGNOSIS — J45.21 MILD INTERMITTENT ASTHMA WITH EXACERBATION: ICD-10-CM

## 2023-04-03 PROCEDURE — 250N000009 HC RX 250: Performed by: EMERGENCY MEDICINE

## 2023-04-03 PROCEDURE — 94640 AIRWAY INHALATION TREATMENT: CPT | Performed by: EMERGENCY MEDICINE

## 2023-04-03 PROCEDURE — 99284 EMERGENCY DEPT VISIT MOD MDM: CPT | Mod: CS,25 | Performed by: EMERGENCY MEDICINE

## 2023-04-03 PROCEDURE — 71046 X-RAY EXAM CHEST 2 VIEWS: CPT

## 2023-04-03 PROCEDURE — C9803 HOPD COVID-19 SPEC COLLECT: HCPCS | Performed by: EMERGENCY MEDICINE

## 2023-04-03 PROCEDURE — 99284 EMERGENCY DEPT VISIT MOD MDM: CPT | Mod: CS | Performed by: EMERGENCY MEDICINE

## 2023-04-03 PROCEDURE — 81025 URINE PREGNANCY TEST: CPT | Performed by: EMERGENCY MEDICINE

## 2023-04-03 PROCEDURE — 87637 SARSCOV2&INF A&B&RSV AMP PRB: CPT | Performed by: EMERGENCY MEDICINE

## 2023-04-03 RX ORDER — ALBUTEROL SULFATE 90 UG/1
2 AEROSOL, METERED RESPIRATORY (INHALATION) EVERY 6 HOURS PRN
Qty: 18 G | Refills: 0 | Status: SHIPPED | OUTPATIENT
Start: 2023-04-03 | End: 2024-08-07

## 2023-04-03 RX ORDER — IPRATROPIUM BROMIDE AND ALBUTEROL SULFATE 2.5; .5 MG/3ML; MG/3ML
3 SOLUTION RESPIRATORY (INHALATION) ONCE
Status: COMPLETED | OUTPATIENT
Start: 2023-04-03 | End: 2023-04-03

## 2023-04-03 RX ORDER — PREDNISONE 20 MG/1
TABLET ORAL
Qty: 10 TABLET | Refills: 0 | Status: SHIPPED | OUTPATIENT
Start: 2023-04-03 | End: 2023-12-06

## 2023-04-03 RX ADMIN — IPRATROPIUM BROMIDE AND ALBUTEROL SULFATE 3 ML: .5; 3 SOLUTION RESPIRATORY (INHALATION) at 13:30

## 2023-04-03 ASSESSMENT — ACTIVITIES OF DAILY LIVING (ADL): ADLS_ACUITY_SCORE: 35

## 2023-04-03 NOTE — ED TRIAGE NOTES
Patient reports cough, increased phlegm, runny nose and congestion. Patient reports being unable to sleep or do activity due to being short of breath. Patient reports symptoms present for 2 days. Patient has history of asthma.     Triage Assessment     Row Name 04/03/23 1208       Triage Assessment (Adult)    Airway WDL WDL       Respiratory WDL    Respiratory WDL X;rhythm/pattern;cough    Rhythm/Pattern, Respiratory shortness of breath    Cough Frequency frequent    Cough Type productive       Skin Circulation/Temperature WDL    Skin Circulation/Temperature WDL WDL       Cardiac WDL    Cardiac WDL WDL       Peripheral/Neurovascular WDL    Peripheral Neurovascular WDL WDL       Cognitive/Neuro/Behavioral WDL    Cognitive/Neuro/Behavioral WDL WDL

## 2023-04-03 NOTE — DISCHARGE INSTRUCTIONS
Take the prednisone as prescribed. Follow up with your primary care provider in the next 5-7 days.

## 2023-04-03 NOTE — ED PROVIDER NOTES
[facility] EMERGENCY DEPARTMENT ([facility])    4/03/23      ED PROVIDER NOTE     History     Chief Complaint   Patient presents with     Cough     Patient reports cough, increased phlegm, runny nose and congestion. Patient reports being unable to sleep or do activity due to being short of breath. Patient reports symptoms present for 2 days. Patient has history of asthma.     The history is provided by the patient and medical records.     Katharine Piña is a 22 year old female with history of CAMRYN, mild to moderate asthma who presents with sore throat, cough and shortness of breath for the past 2 days she has not been able to sleep or really do her usual activities due to this shortness of breath. For her asthma she is on albuterol inhaler, Wixela inhaler, Allegra.  She has been on prednisone in the past.  No history of intubations for asthma. Patient is a smoker.    Per [facility] records, patient has had 2 doses of the Pfizer COVID-19 vaccine, she did get a flu shot this year. She was seen in the emergency department on 1/21/2023 with reports of sore throat.  She had a rapid strep test and COVID/influenza swabs done, this was positive for group A strep.  She was treated with amoxicillin.     Past Medical History  Past Medical History:   Diagnosis Date     Uncomplicated asthma      History reviewed. No pertinent surgical history.  albuterol (PROAIR HFA/PROVENTIL HFA/VENTOLIN HFA) 108 (90 Base) MCG/ACT inhaler  azithromycin (ZITHROMAX Z-JAMES) 250 MG tablet  Fexofenadine HCl (ALLEGRA PO)  fluticasone-salmeterol (WIXELA INHUB) 100-50 MCG/ACT inhaler  predniSONE (DELTASONE) 20 MG tablet      No Known Allergies  Family History  History reviewed. No pertinent family history.  Social History   Social History     Tobacco Use     Smoking status: Never     Smokeless tobacco: Never   Substance Use Topics     Alcohol use: Yes     Comment: occasional     Drug use: Yes     Types: Marijuana         A medically appropriate  "review of systems was performed with pertinent positives and negatives noted in the HPI, and all other systems negative.    Physical Exam   BP: 137/87  Pulse: 102  Temp: 98.9  F (37.2  C)  Resp: 18  Height: 157.5 cm (5' 2\")  Weight: 85.7 kg (189 lb)  SpO2: 95 %  Physical Exam  Vitals and nursing note reviewed.   Constitutional:       General: She is not in acute distress.     Appearance: She is not diaphoretic.   HENT:      Head: Atraumatic.      Nose: No rhinorrhea.      Mouth/Throat:      Mouth: Mucous membranes are moist.      Pharynx: Oropharynx is clear. No oropharyngeal exudate or posterior oropharyngeal erythema.   Eyes:      General: No scleral icterus.     Conjunctiva/sclera: Conjunctivae normal.   Cardiovascular:      Rate and Rhythm: Normal rate and regular rhythm.      Heart sounds: Normal heart sounds. No murmur heard.     No friction rub. No gallop.   Pulmonary:      Effort: No respiratory distress.      Breath sounds: No stridor. Examination of the right-upper field reveals wheezing. Examination of the left-upper field reveals wheezing. Examination of the right-middle field reveals wheezing. Examination of the left-middle field reveals wheezing. Examination of the right-lower field reveals wheezing. Examination of the left-lower field reveals wheezing. Wheezing present. No rhonchi or rales.   Abdominal:      General: Bowel sounds are normal.      Palpations: Abdomen is soft.      Tenderness: There is no abdominal tenderness.   Musculoskeletal:         General: No tenderness.      Cervical back: Normal range of motion and neck supple.   Skin:     General: Skin is warm.      Findings: No rash.   Neurological:      Mental Status: She is alert and oriented to person, place, and time.   Psychiatric:         Mood and Affect: Mood normal.         Behavior: Behavior normal.         ED Course, Procedures, & Data      Procedures             Results for orders placed or performed during the hospital encounter of " 04/03/23   XR Chest 2 Views     Status: None    Narrative    XR CHEST 2 VIEWS   4/3/2023 3:07 PM     HISTORY: cough, r/o CAP    COMPARISON: Chest radiograph 10/9/2022      Impression    IMPRESSION: No acute cardiopulmonary disease.    PATTI VIGIL MD         SYSTEM ID:  [id number]   Symptomatic Influenza A/B, RSV, & SARS-CoV2 PCR (COVID-19) Nasopharyngeal     Status: Normal    Specimen: Nasopharyngeal; Swab   Result Value Ref Range    Influenza A PCR Negative Negative    Influenza B PCR Negative Negative    RSV PCR Negative Negative    SARS CoV2 PCR Negative Negative    Narrative    Testing was performed using the Xpert Xpress CoV2/Flu/RSV Assay on the Cepheid GeneXpert Instrument. This test should be ordered for the detection of SARS-CoV-2, influenza, and RSV viruses in individuals who meet clinical and/or epidemiological criteria. Test performance is unknown in asymptomatic patients. This test is for in vitro diagnostic use under the FDA EUA for laboratories certified under CLIA to perform high or moderate complexity testing. This test has not been FDA cleared or approved. A negative result does not rule out the presence of PCR inhibitors in the specimen or target RNA in concentration below the limit of detection for the assay. If only one viral target is positive but coinfection with multiple targets is suspected, the sample should be re-tested with another FDA cleared, approved, or authorized test, if coinfection would change clinical management. This test was validated by the [facility]. These laboratories are certified under the Clinical Laboratory Improvement Amendments of 1988 (CLIA-88) as qualified to perform high complexity laboratory testing.   hCG qual urine POCT     Status: Normal   Result Value Ref Range    HCG Qual Urine Negative Negative    Internal QC Check POCT Valid Valid    POCT Kit Lot Number [id number]     POCT Kit Expiration Date 09/30/2024      Medications   ipratropium -  albuterol 0.5 mg/2.5 mg/3 mL (DUONEB) neb solution 3 mL (3 mLs Nebulization $Given 4/3/23 4612)     Labs Ordered and Resulted from Time of ED Arrival to Time of ED Departure   INFLUENZA A/B, RSV, & SARS-COV2 PCR - Normal       Result Value    Influenza A PCR Negative      Influenza B PCR Negative      RSV PCR Negative      SARS CoV2 PCR Negative     HCG QUALITATIVE URINE POCT - Normal    HCG Qual Urine Negative      Internal QC Check POCT Valid      POCT Kit Lot Number [id number]      POCT Kit Expiration Date 09/30/2024       XR Chest 2 Views   Final Result   IMPRESSION: No acute cardiopulmonary disease.      PATTI VIGIL MD            SYSTEM ID:  [id number]             Critical care was not performed.     Medical Decision Making  The patient's presentation was of moderate complexity (a chronic illness mild to moderate exacerbation, progression, or side effect of treatment).    The patient's evaluation involved:  ordering and/or review of 3+ test(s) in this encounter (see separate area of note for details)    The patient's management necessitated moderate risk (prescription drug management including medications given in the ED).      Assessment & Plan    21 yo female here with wheezing and SOB for the last several days secondary to viral URI and acute asthma exacerbation. She was treated with a duoneb with improvement of her symptoms. Her CXR was negative. She will be discharged to home with f/u with her PCP in the next week.    I have reviewed the nursing notes. I have reviewed the findings, diagnosis, plan and need for follow up with the patient.    New Prescriptions    ALBUTEROL (PROAIR HFA/PROVENTIL HFA/VENTOLIN HFA) 108 (90 BASE) MCG/ACT INHALER    Inhale 2 puffs into the lungs every 6 hours as needed for shortness of breath, wheezing or cough    PREDNISONE (DELTASONE) 20 MG TABLET    Take two tablets (= 40mg) each day for 5 (five) days       Final diagnoses:   Viral URI   Mild intermittent asthma with  exacerbation     I, Marcela Wagoner, am serving as a trained medical scribe to document services personally performed by Hola Govea MD based on the provider's statements to me on April 3, 2023.  This document has been checked and approved by the attending provider.    I, Hola Govea MD, was physically present and have reviewed and verified the accuracy of this note documented by Marcela Wagoner, medical scribe.      Hola Govea MD     [facility] EMERGENCY DEPARTMENT  4/3/2023     Hola Govea MD  04/03/23 1544

## 2023-08-09 ENCOUNTER — HOSPITAL ENCOUNTER (EMERGENCY)
Facility: CLINIC | Age: 23
Discharge: HOME | End: 2023-08-09
Attending: FAMILY MEDICINE | Admitting: FAMILY MEDICINE
Payer: COMMERCIAL

## 2023-08-09 VITALS
RESPIRATION RATE: 16 BRPM | SYSTOLIC BLOOD PRESSURE: 134 MMHG | DIASTOLIC BLOOD PRESSURE: 83 MMHG | HEART RATE: 82 BPM | OXYGEN SATURATION: 100 % | TEMPERATURE: 99.6 F

## 2023-08-09 DIAGNOSIS — S61.012A THUMB LACERATION, LEFT, INITIAL ENCOUNTER: ICD-10-CM

## 2023-08-09 PROCEDURE — 12002 RPR S/N/AX/GEN/TRNK2.6-7.5CM: CPT | Mod: FA | Performed by: FAMILY MEDICINE

## 2023-08-09 PROCEDURE — 99283 EMERGENCY DEPT VISIT LOW MDM: CPT | Mod: 25 | Performed by: FAMILY MEDICINE

## 2023-08-09 PROCEDURE — 99283 EMERGENCY DEPT VISIT LOW MDM: CPT | Performed by: FAMILY MEDICINE

## 2023-08-09 RX ORDER — LIDOCAINE HYDROCHLORIDE 10 MG/ML
INJECTION, SOLUTION INFILTRATION; PERINEURAL
Status: DISCONTINUED
Start: 2023-08-09 | End: 2023-08-09 | Stop reason: HOSPADM

## 2023-08-09 ASSESSMENT — ACTIVITIES OF DAILY LIVING (ADL): ADLS_ACUITY_SCORE: 33

## 2023-08-09 NOTE — LETTER
August 9, 2023      To Whom It May Concern:      Katharine Piña was seen in our Emergency Department today, 08/09/23.  I expect her condition to improve over the next 3-4 days.  She may return to work when improved.    Sincerely,        Alber Truong MD

## 2023-08-10 NOTE — ED PROVIDER NOTES
ED Provider Note  [facility]      History     Chief Complaint   Patient presents with    Laceration     Left thumb, cut with  around 7:30 pm at work.      HPI  Katharine Piña is a 22 year old female with PMH of CAMRYN, asthma, and viral URI who presents to the ED BIBA with a left thumb lac. She reports she accidentally cut her left thumb with a  while she was at work today around 1930. Patient reports the cut is on the knuckle. EMS wrapped the thumb up, bleeding is under control. Last tdap vaccine 2012.    Past Medical History  Past Medical History:   Diagnosis Date    Uncomplicated asthma      No past surgical history on file.  albuterol (PROAIR HFA/PROVENTIL HFA/VENTOLIN HFA) 108 (90 Base) MCG/ACT inhaler  Fexofenadine HCl (ALLEGRA PO)  fluticasone-salmeterol (WIXELA INHUB) 100-50 MCG/ACT inhaler  azithromycin (ZITHROMAX Z-JAMES) 250 MG tablet  predniSONE (DELTASONE) 20 MG tablet      No Known Allergies  Family History  No family history on file.  Social History   Social History     Tobacco Use    Smoking status: Never    Smokeless tobacco: Never   Substance Use Topics    Alcohol use: Yes     Comment: occasional    Drug use: Yes     Types: Marijuana         A medically appropriate review of systems was performed with pertinent positives and negatives noted in the HPI, and all other systems negative.    Physical Exam   BP: 127/76  Pulse: 88  Temp: 99.6  F (37.6  C)  Resp: 16  SpO2: 100 %  Physical Exam  Constitutional:       General: She is not in acute distress.     Appearance: Normal appearance. She is not diaphoretic.   HENT:      Head: Atraumatic.      Mouth/Throat:      Mouth: Mucous membranes are moist.   Eyes:      General: No scleral icterus.     Conjunctiva/sclera: Conjunctivae normal.   Cardiovascular:      Rate and Rhythm: Normal rate.      Heart sounds: Normal heart sounds.   Pulmonary:      Effort: No respiratory distress.      Breath sounds: Normal breath  sounds.   Abdominal:      General: Abdomen is flat.   Musculoskeletal:      Cervical back: Neck supple.   Skin:     General: Skin is warm.      Findings: No rash.      Comments: Patient has a 3 cm lack on the dorsal aspect of her thumb not involving ligaments or significant neurologic structures.   Neurological:      Mental Status: She is alert.           ED Course, Procedures, & Data      Procedures       [facility] Procedure Note        Laceration Repair:    Performed by: Alber Truong MD  Authorized by: Alber Truong MD  Consent given by: Patient who states understanding of the procedure being performed after discussing the risks, benefits and alternatives.    Preparation: Patient was prepped and draped in usual sterile fashion.  Irrigation solution: saline    Body area:thumb finger  Laceration length: 3cm  Contamination: The wound is not contaminated.  Foreign bodies:none  Tendon involvement: none  Anesthesia: Local  Local anesthetic: Bupivacaine 0.5%, Lidocaine     1%  Anesthetic total: 6ml    Debridement: none  Skin closure: Closed with 7 x 4.0 Ethilon  Technique: interrupted  Approximation: close  Approximation difficulty: simple    Patient tolerance: Patient tolerated the procedure well with no immediate complications.                 No results found for any visits on 08/09/23.  Medications - No data to display  Labs Ordered and Resulted from Time of ED Arrival to Time of ED Departure - No data to display  No orders to display          Critical care was not performed.     Medical Decision Making  The patient's presentation was of low complexity (an acute and uncomplicated illness or injury).    The patient's evaluation involved:  history and exam without other MDM data elements    The patient's management necessitated moderate risk (a decision regarding minor procedure (laceration repair) with risk factors of none).    Assessment & Plan        I have reviewed the nursing  notes. I have reviewed the findings, diagnosis, plan and need for follow up with the patient.        Final diagnoses:   Thumb laceration, left, initial encounter       Alber Truong  [facility] EMERGENCY DEPARTMENT  8/9/2023     Alber Truong MD  08/11/23 2603

## 2023-08-10 NOTE — ED TRIAGE NOTES
Patient reports she accidentally cut her left thumb with a  while she was at work today. Patient reports it happened around 7:30 pm at work. Patient reports the cut is on the knuckle. EMS wrapped the thumb up, bleeding is under control.

## 2023-08-11 ENCOUNTER — TELEPHONE (OUTPATIENT)
Dept: FAMILY MEDICINE | Facility: CLINIC | Age: 23
End: 2023-08-11
Payer: COMMERCIAL

## 2023-08-17 ENCOUNTER — HOSPITAL ENCOUNTER (EMERGENCY)
Facility: CLINIC | Age: 23
Discharge: HOME | End: 2023-08-17
Attending: EMERGENCY MEDICINE | Admitting: EMERGENCY MEDICINE
Payer: COMMERCIAL

## 2023-08-17 VITALS
BODY MASS INDEX: 34.32 KG/M2 | OXYGEN SATURATION: 99 % | RESPIRATION RATE: 18 BRPM | TEMPERATURE: 99 F | SYSTOLIC BLOOD PRESSURE: 126 MMHG | WEIGHT: 186.5 LBS | HEART RATE: 83 BPM | DIASTOLIC BLOOD PRESSURE: 86 MMHG | HEIGHT: 62 IN

## 2023-08-17 DIAGNOSIS — Z48.02 VISIT FOR SUTURE REMOVAL: ICD-10-CM

## 2023-08-17 PROCEDURE — 99282 EMERGENCY DEPT VISIT SF MDM: CPT | Performed by: EMERGENCY MEDICINE

## 2023-08-17 PROCEDURE — 99282 EMERGENCY DEPT VISIT SF MDM: CPT | Mod: 25 | Performed by: EMERGENCY MEDICINE

## 2023-08-17 NOTE — ED PROVIDER NOTES
"ED Provider Note  [facility]      History     Chief Complaint   Patient presents with    Suture Removal     Patient presents for suture removal. Patient has 7 sutures to left thumb. No signs or symptoms of infection.     HPI  Katharine Piña is a 22 year old female who presents emergency department 7 days after a suture repair to the left thumb that she sustained while at work using a  with her dominant hand (right) cutting her nondominant hand.  Notified by the nurse in triage who removed her stitches successfully that her wound needed and evaluation by me to determine if the patient was cleared to return to work.    Past Medical History  Past Medical History:   Diagnosis Date    Uncomplicated asthma      History reviewed. No pertinent surgical history.  albuterol (PROAIR HFA/PROVENTIL HFA/VENTOLIN HFA) 108 (90 Base) MCG/ACT inhaler  azithromycin (ZITHROMAX Z-JAMES) 250 MG tablet  Fexofenadine HCl (ALLEGRA PO)  fluticasone-salmeterol (WIXELA INHUB) 100-50 MCG/ACT inhaler  predniSONE (DELTASONE) 20 MG tablet      No Known Allergies  Family History  History reviewed. No pertinent family history.  Social History   Social History     Tobacco Use    Smoking status: Never    Smokeless tobacco: Never   Substance Use Topics    Alcohol use: Yes     Comment: occasional    Drug use: Yes     Types: Marijuana         A medically appropriate review of systems was performed with pertinent positives and negatives noted in the HPI, and all other systems negative.    Physical Exam   BP: 126/86  Pulse: 83  Temp: 99  F (37.2  C)  Resp: 18  Height: 157.5 cm (5' 2\")  Weight: 84.6 kg (186 lb 8 oz)  SpO2: 99 %  Physical Exam  Constitutional:       General: She is awake. She is not in acute distress.     Appearance: Normal appearance. She is well-developed. She is not ill-appearing or toxic-appearing.   HENT:      Head: Atraumatic.      Mouth/Throat:      Pharynx: No oropharyngeal exudate.   Eyes:      " General: No scleral icterus.     Pupils: Pupils are equal, round, and reactive to light.   Cardiovascular:      Rate and Rhythm: Normal rate.   Pulmonary:      Effort: Pulmonary effort is normal. No respiratory distress.   Abdominal:      General: Abdomen is flat.   Musculoskeletal:         General: No tenderness.      Comments: Left thumb: Dorsal laceration with sutures removed, the edges of the wound are slightly pulled apart and macerated, no surrounding skin changes such as erythema, no warmth, no tenderness to palpation.  No purulent discharge present.   Skin:     General: Skin is warm.      Findings: No rash.   Neurological:      Mental Status: She is alert and oriented to person, place, and time.   Psychiatric:         Attention and Perception: Attention normal.         Mood and Affect: Mood normal.         Speech: Speech normal.         Behavior: Behavior normal. Behavior is cooperative.           ED Course, Procedures, & Data      Procedures                      No results found for any visits on 08/17/23.  Medications - No data to display  Labs Ordered and Resulted from Time of ED Arrival to Time of ED Departure - No data to display  No orders to display          Critical care was not performed.     Medical Decision Making  The patient's presentation was of low complexity (an acute and uncomplicated illness or injury).    The patient's evaluation involved:  review of external note(s) from 1 sources (ED)    The patient's management necessitated only low risk treatment.    Assessment & Plan    Katharine Piña is a 22 year old female who presents emergency department 7 days after a suture repair to the left thumb that she sustained while at work using a  with her dominant hand (right) cutting her nondominant hand.  Wound is technically not closed with macerated edges, would benefit from immobilization and a little bit more time to allow epithelialization to resolve the gaping.  Discussed this with  the patient and we will provide her a work note providing additional days off.  Return precautions given.  Okay to discharge    I have reviewed the nursing notes. I have reviewed the findings, diagnosis, plan and need for follow up with the patient.    Discharge Medication List as of 8/17/2023  5:34 PM          Final diagnoses:   Visit for suture removal       Federico Muñoz MD PhD  [facility] EMERGENCY DEPARTMENT  8/17/2023     Federico Muñoz MD  08/18/23 0837

## 2023-08-17 NOTE — ED TRIAGE NOTES
Patient presents for suture removal. Patient has 7 sutures to left thumb. No signs or symptoms of infection.     Triage Assessment       Row Name 08/17/23 6875       Triage Assessment (Adult)    Airway WDL WDL       Respiratory WDL    Respiratory WDL WDL       Skin Circulation/Temperature WDL    Skin Circulation/Temperature WDL all  sutures in place to left thumb       Cardiac WDL    Cardiac WDL WDL       Peripheral/Neurovascular WDL    Peripheral Neurovascular WDL WDL       Cognitive/Neuro/Behavioral WDL    Cognitive/Neuro/Behavioral WDL WDL

## 2023-08-17 NOTE — Clinical Note
Katharine Piña was seen and treated in our emergency department on 8/17/2023.  She may return to work on 08/21/2023.       If you have any questions or concerns, please don't hesitate to call.      Federico Muñoz MD

## 2023-08-21 ENCOUNTER — HOSPITAL ENCOUNTER (EMERGENCY)
Facility: CLINIC | Age: 23
Discharge: HOME | End: 2023-08-21
Attending: EMERGENCY MEDICINE | Admitting: EMERGENCY MEDICINE
Payer: COMMERCIAL

## 2023-08-21 VITALS
DIASTOLIC BLOOD PRESSURE: 76 MMHG | OXYGEN SATURATION: 100 % | HEART RATE: 76 BPM | TEMPERATURE: 98.4 F | RESPIRATION RATE: 16 BRPM | SYSTOLIC BLOOD PRESSURE: 123 MMHG

## 2023-08-21 DIAGNOSIS — S61.012D LACERATION OF LEFT THUMB WITHOUT FOREIGN BODY WITHOUT DAMAGE TO NAIL, SUBSEQUENT ENCOUNTER: ICD-10-CM

## 2023-08-21 PROCEDURE — 99282 EMERGENCY DEPT VISIT SF MDM: CPT | Performed by: EMERGENCY MEDICINE

## 2023-08-21 ASSESSMENT — ACTIVITIES OF DAILY LIVING (ADL): ADLS_ACUITY_SCORE: 35

## 2023-08-21 NOTE — DISCHARGE INSTRUCTIONS
Keep your tube gauze dressing clean and dry.  Do not use your left thumb for the next 4 days.    Please make an appointment with your primary care for suture removal in 4 days.

## 2023-08-21 NOTE — ED PROVIDER NOTES
"ED Provider Note  [facility]      History     Chief Complaint   Patient presents with    Wound Check     Here last week for sutures removal, told to return to check if wound is healing due to being on left thumb knuckle that \"bends a lot,\" wound is currently dressed with splint, returning to work tomorrow.     VANESA Piña is a 22 year old female who presents seeking suture removal and wound check.  Patient initially had sutures placed in her left thumb on 8/9/2023 along the extensor aspect of the thumb and over the IP joint.  Patient subsequently was seen 8 days later and found to have some maceration of her wound.  The stitches were removed at that time but the patient was placed in a splint and told not to use her left in order to avoid wound dehiscence.  Patient has had her thumb in a splint since that time and now presents to the ER for reevaluation.    Past Medical History:   Diagnosis Date    Uncomplicated asthma      Current Outpatient Medications   Medication Sig Dispense Refill    albuterol (PROAIR HFA/PROVENTIL HFA/VENTOLIN HFA) 108 (90 Base) MCG/ACT inhaler Inhale 2 puffs into the lungs every 6 hours as needed for shortness of breath, wheezing or cough 18 g 0    azithromycin (ZITHROMAX Z-JAMES) 250 MG tablet As directed 6 tablet 0    Fexofenadine HCl (ALLEGRA PO)       fluticasone-salmeterol (WIXELA INHUB) 100-50 MCG/ACT inhaler Inhale 1 puff into the lungs 2 times daily for 30 days 1 each 1    predniSONE (DELTASONE) 20 MG tablet Take two tablets (= 40mg) each day for 5 (five) days 10 tablet 0     No Known Allergies  Social History     Socioeconomic History    Marital status: Single     Spouse name: Not on file    Number of children: Not on file    Years of education: Not on file    Highest education level: Not on file   Occupational History    Not on file   Tobacco Use    Smoking status: Never    Smokeless tobacco: Never   Substance and Sexual Activity    Alcohol use: " Yes     Comment: occasional    Drug use: Yes     Types: Marijuana    Sexual activity: Not Currently   Other Topics Concern    Not on file   Social History Narrative    Not on file     Social Determinants of Health     Financial Resource Strain: Not on file   Food Insecurity: Not on file   Transportation Needs: Not on file   Physical Activity: Not on file   Stress: Not on file   Social Connections: Not on file   Intimate Partner Violence: Not on file   Housing Stability: Not on file     History reviewed. No pertinent surgical history.  No family history on file.       A medically appropriate review of systems was performed with pertinent positives and negatives noted in the HPI, and all other systems negative.    Physical Exam   BP: 123/76  Pulse: 76  Temp: 98.4  F (36.9  C)  Resp: 16  SpO2: 100 %  Physical Exam  Vitals and nursing note reviewed.   Skin:     Comments: Patient's left thumb reveals a healing laceration with no dehiscence and the maceration is mostly resolved.   Neurological:      Mental Status: She is alert.   Psychiatric:         Mood and Affect: Mood normal.           ED Course, Procedures, & Data      Procedures          Critical care was not performed.     Medical Decision Making  The patient's presentation was of straightforward complexity (a clearly self-limited or minor problem).    The patient's evaluation involved:  history and exam without other MDM data elements    The patient's management necessitated only low risk treatment.    Assessment & Plan      I have reviewed the nursing notes.    I have reviewed the findings, diagnosis, plan and need for follow up with the patient.    Tube gauze dressing applied    Final diagnoses:   Laceration of left thumb without foreign body without damage to nail, subsequent encounter - with normal healing     Keep your tube gauze dressing clean and dry.  Do not use your left thumb for the next 4 days.    Please make an appointment with your primary care for  suture removal in 4 days.    Eric Palmer MD, MD    [facility] EMERGENCY DEPARTMENT  8/21/2023     Eric Palmer MD  08/21/23 [id number]

## 2023-08-21 NOTE — Clinical Note
Katharine Piña was seen and treated in our emergency department on 8/21/2023.  She may return to work on 08/22/2023.  Patient will be unable to use her left thumb until 8/25/2023     If you have any questions or concerns, please don't hesitate to call.      Eric Palmer MD

## 2023-12-06 ENCOUNTER — HOSPITAL ENCOUNTER (EMERGENCY)
Facility: CLINIC | Age: 23
Discharge: HOME | End: 2023-12-06
Admitting: PHYSICIAN ASSISTANT
Payer: COMMERCIAL

## 2023-12-06 ENCOUNTER — APPOINTMENT (OUTPATIENT)
Dept: GENERAL RADIOLOGY | Facility: CLINIC | Age: 23
End: 2023-12-06
Attending: PHYSICIAN ASSISTANT
Payer: COMMERCIAL

## 2023-12-06 VITALS
TEMPERATURE: 98.2 F | OXYGEN SATURATION: 97 % | SYSTOLIC BLOOD PRESSURE: 114 MMHG | RESPIRATION RATE: 16 BRPM | HEART RATE: 97 BPM | DIASTOLIC BLOOD PRESSURE: 82 MMHG

## 2023-12-06 DIAGNOSIS — J45.21 MILD INTERMITTENT ASTHMA WITH EXACERBATION: ICD-10-CM

## 2023-12-06 DIAGNOSIS — B33.8 RSV (RESPIRATORY SYNCYTIAL VIRUS INFECTION): ICD-10-CM

## 2023-12-06 LAB
FLUAV RNA SPEC QL NAA+PROBE: NEGATIVE
FLUBV RNA RESP QL NAA+PROBE: NEGATIVE
RSV RNA SPEC NAA+PROBE: POSITIVE
SARS-COV-2 RNA RESP QL NAA+PROBE: NEGATIVE

## 2023-12-06 PROCEDURE — 94640 AIRWAY INHALATION TREATMENT: CPT | Performed by: PHYSICIAN ASSISTANT

## 2023-12-06 PROCEDURE — 99284 EMERGENCY DEPT VISIT MOD MDM: CPT | Performed by: PHYSICIAN ASSISTANT

## 2023-12-06 PROCEDURE — 71046 X-RAY EXAM CHEST 2 VIEWS: CPT

## 2023-12-06 PROCEDURE — 250N000012 HC RX MED GY IP 250 OP 636 PS 637: Performed by: PHYSICIAN ASSISTANT

## 2023-12-06 PROCEDURE — 250N000009 HC RX 250: Performed by: PHYSICIAN ASSISTANT

## 2023-12-06 PROCEDURE — 87637 SARSCOV2&INF A&B&RSV AMP PRB: CPT | Performed by: PHYSICIAN ASSISTANT

## 2023-12-06 PROCEDURE — 99284 EMERGENCY DEPT VISIT MOD MDM: CPT | Mod: 25 | Performed by: PHYSICIAN ASSISTANT

## 2023-12-06 RX ORDER — IBUPROFEN 600 MG/1
600 TABLET, FILM COATED ORAL ONCE
Status: DISCONTINUED | OUTPATIENT
Start: 2023-12-06 | End: 2023-12-06

## 2023-12-06 RX ORDER — ACETAMINOPHEN 500 MG
1000 TABLET ORAL ONCE
Status: DISCONTINUED | OUTPATIENT
Start: 2023-12-06 | End: 2023-12-06

## 2023-12-06 RX ORDER — OXYCODONE HYDROCHLORIDE 5 MG/1
5 TABLET ORAL ONCE
Status: DISCONTINUED | OUTPATIENT
Start: 2023-12-06 | End: 2023-12-06

## 2023-12-06 RX ORDER — PREDNISONE 20 MG/1
40 TABLET ORAL DAILY
Qty: 8 TABLET | Refills: 0 | Status: SHIPPED | OUTPATIENT
Start: 2023-12-06 | End: 2023-12-10

## 2023-12-06 RX ORDER — PREDNISONE 20 MG/1
40 TABLET ORAL ONCE
Status: COMPLETED | OUTPATIENT
Start: 2023-12-06 | End: 2023-12-06

## 2023-12-06 RX ORDER — IPRATROPIUM BROMIDE AND ALBUTEROL SULFATE 2.5; .5 MG/3ML; MG/3ML
3 SOLUTION RESPIRATORY (INHALATION) ONCE
Status: COMPLETED | OUTPATIENT
Start: 2023-12-06 | End: 2023-12-06

## 2023-12-06 RX ADMIN — PREDNISONE 40 MG: 20 TABLET ORAL at 18:57

## 2023-12-06 RX ADMIN — IPRATROPIUM BROMIDE AND ALBUTEROL SULFATE 3 ML: .5; 3 SOLUTION RESPIRATORY (INHALATION) at 19:00

## 2023-12-06 ASSESSMENT — ACTIVITIES OF DAILY LIVING (ADL)
ADLS_ACUITY_SCORE: 35
ADLS_ACUITY_SCORE: 35

## 2023-12-06 NOTE — ED PROVIDER NOTES
ED Provider Note  [facility]      History     Chief Complaint   Patient presents with    Cough    Wheezing     The history is provided by the patient and medical records.     22yo F pmhx asthma (no intubations or hospitalizations) p/w concern for asthma exacerbation.  States that starting yesterday, and worsening today she has felt short of breath and wheezy.  She used her rescue inhaler, and felt like this mildly helped.  She notes that she has also had mild sore throat, rhinorrhea, congestion, productive cough.  She has felt subjectively febrile.  No chest pain, GI symptoms, VTE history, recent surgery, extremity edema, OCP use.    Past Medical History  Past Medical History:   Diagnosis Date    Uncomplicated asthma      No past surgical history on file.  predniSONE (DELTASONE) 20 MG tablet  albuterol (PROAIR HFA/PROVENTIL HFA/VENTOLIN HFA) 108 (90 Base) MCG/ACT inhaler  azithromycin (ZITHROMAX Z-JAMES) 250 MG tablet  Fexofenadine HCl (ALLEGRA PO)  fluticasone-salmeterol (WIXELA INHUB) 100-50 MCG/ACT inhaler      No Known Allergies  Family History  No family history on file.  Social History   Social History     Tobacco Use    Smoking status: Never    Smokeless tobacco: Never   Substance Use Topics    Alcohol use: Yes     Comment: occasional    Drug use: Yes     Types: Marijuana         A medically appropriate review of systems was performed with pertinent positives and negatives noted in the HPI, and all other systems negative.    Physical Exam   BP: (!) 144/95  Pulse: 86  Temp: 98.2  F (36.8  C)  Resp: 18  SpO2: 95 %BP (!) 144/95   Pulse 86   Temp 98.2  F (36.8  C) (Oral)   Resp 18   SpO2 95%     Physical Exam  Constitutional:       General: She is not in acute distress.     Appearance: Normal appearance. She is well-developed.   HENT:      Head: Normocephalic and atraumatic.   Eyes:      Conjunctiva/sclera: Conjunctivae normal.   Cardiovascular:      Rate and Rhythm: Normal rate.    Pulmonary:      Effort: Pulmonary effort is normal. No tachypnea, accessory muscle usage or respiratory distress.      Breath sounds: Wheezing (mild, diffuse, expiratory wheezes) present.      Comments: Speaking full sentences  Abdominal:      General: Abdomen is flat.   Musculoskeletal:      Cervical back: Normal range of motion and neck supple.   Skin:     General: Skin is warm and dry.      Findings: No rash.   Neurological:      Mental Status: She is alert and oriented to person, place, and time.           ED Course, Procedures, & Data     ED Course as of 12/06/23 2117   Wed Dec 06, 2023   2020 Resp Syncytial Virus(!): Positive     Procedures                      Results for orders placed or performed during the hospital encounter of 12/06/23   XR Chest 2 Views     Status: None    Narrative    EXAM: XR CHEST 2 VIEWS  LOCATION: [facility]  DATE: 12/6/2023    INDICATION: Rule out pneumonia; shortness of breath, wheezing  COMPARISON: None.      Impression    IMPRESSION: Negative chest.   Symptomatic Influenza A/B, RSV, & SARS-CoV2 PCR (COVID-19) Nose     Status: Abnormal    Specimen: Nose; Swab   Result Value Ref Range    Influenza A PCR Negative Negative    Influenza B PCR Negative Negative    RSV PCR Positive (A) Negative    SARS CoV2 PCR Negative Negative    Narrative    Testing was performed using the Xpert Xpress CoV2/Flu/RSV Assay on the [employer] GeneXpert Instrument. This test should be ordered for the detection of SARS-CoV-2, influenza, and RSV viruses in individuals who meet clinical and/or epidemiological criteria. Test performance is unknown in asymptomatic patients. This test is for in vitro diagnostic use under the FDA EUA for laboratories certified under CLIA to perform high or moderate complexity testing. This test has not been FDA cleared or approved. A negative result does not rule out the presence of PCR inhibitors in the specimen or target RNA in  concentration below the limit of detection for the assay. If only one viral target is positive but coinfection with multiple targets is suspected, the sample should be re-tested with another FDA cleared, approved, or authorized test, if coinfection would change clinical management. This test was validated by the [facility]. These laboratories are certified under the Clinical Laboratory Improvement Amendments of 1988 (CLIA-88) as qualified to perform high complexity laboratory testing.     Medications   ipratropium - albuterol 0.5 mg/2.5 mg/3 mL (DUONEB) neb solution 3 mL (3 mLs Nebulization $Given 12/6/23 8474)   predniSONE (DELTASONE) tablet 40 mg (40 mg Oral $Given 12/6/23 7345)     Labs Ordered and Resulted from Time of ED Arrival to Time of ED Departure   INFLUENZA A/B, RSV, & SARS-COV2 PCR - Abnormal       Result Value    Influenza A PCR Negative      Influenza B PCR Negative      RSV PCR Positive (*)     SARS CoV2 PCR Negative       XR Chest 2 Views   Final Result   IMPRESSION: Negative chest.             Critical care was not performed.     Medical Decision Making  The patient's presentation was of moderate complexity (an acute illness with systemic symptoms).    The patient's evaluation involved:  review of external note(s) from 1 sources (clinic)  ordering and/or review of 2 test(s) in this encounter (labs and CXR)    The patient's management necessitated moderate risk (prescription drug management including medications given in the ED).    Assessment & Plan    24yo F pmhx asthma (no intubations or hospitalizations) p/w concern for asthma exacerbation, with associated symptoms of URI with cough.  No fevers.  No PE risk factors/PERC negative.  In ED patient is HDS/AF, NAD, well-appearing.  No respiratory distress.  Speaking full sentences.  She has mild, diffuse expiratory wheezes.  Symptoms consistent with asthma exacerbation.  Patient received a DuoNeb and initial dose of prednisone in  the ER with resolution of wheezing, improvement in subjective symptoms..  Chest x-ray was obtained to rule out pneumonia and negative..  We also tested her for COVID and flu, which were negative.  However, she is RSV positive tonight.  Patient is well-appearing, in no respiratory distress.  She will be discharged with a script for prednisone.  She states that she has a rescue inhaler and her daily asthma inhaler at home (not sure name).  Discharged with PCP follow-up, and strict ER return precautions for worsening.    I have reviewed the nursing notes. I have reviewed the findings, diagnosis, plan and need for follow up with the patient.    New Prescriptions    PREDNISONE (DELTASONE) 20 MG TABLET    Take 2 tablets (40 mg) by mouth daily for 4 days       Final diagnoses:   RSV (respiratory syncytial virus infection)   Mild intermittent asthma with exacerbation         Jose Juan Srivastava PA-C  [facility] EMERGENCY DEPARTMENT  12/6/2023     Jose Juan Srivastava PA-C  12/06/23 9729

## 2023-12-06 NOTE — Clinical Note
Katharine Piña was seen and treated in our emergency department on 12/6/2023.         Sincerely,     [facility] Emergency Department

## 2023-12-06 NOTE — ED TRIAGE NOTES
Pt presented to ED w/ c/o wheezing, productive cough. Pt reports s/s since Tuesday. Pt reports asthma exacerbation PTA and use of rescue inhaler     Triage Assessment (Adult)       Row Name 12/06/23 6148          Triage Assessment    Airway WDL WDL        Respiratory WDL    Respiratory WDL cough     Cough Type productive        Cardiac WDL    Cardiac WDL WDL

## 2024-01-08 ENCOUNTER — HOSPITAL ENCOUNTER (EMERGENCY)
Facility: CLINIC | Age: 24
Discharge: HOME | End: 2024-01-08
Attending: EMERGENCY MEDICINE | Admitting: PHYSICIAN ASSISTANT
Payer: COMMERCIAL

## 2024-01-08 ENCOUNTER — APPOINTMENT (OUTPATIENT)
Dept: GENERAL RADIOLOGY | Facility: CLINIC | Age: 24
End: 2024-01-08
Attending: PHYSICIAN ASSISTANT
Payer: COMMERCIAL

## 2024-01-08 VITALS
BODY MASS INDEX: 34.6 KG/M2 | OXYGEN SATURATION: 98 % | SYSTOLIC BLOOD PRESSURE: 122 MMHG | HEIGHT: 62 IN | RESPIRATION RATE: 18 BRPM | TEMPERATURE: 98.5 F | WEIGHT: 188 LBS | DIASTOLIC BLOOD PRESSURE: 80 MMHG | HEART RATE: 62 BPM

## 2024-01-08 DIAGNOSIS — U07.1 COVID-19 VIRUS INFECTION: ICD-10-CM

## 2024-01-08 DIAGNOSIS — J45.21 MILD INTERMITTENT ASTHMA WITH EXACERBATION: ICD-10-CM

## 2024-01-08 LAB
ANION GAP SERPL CALCULATED.3IONS-SCNC: 12 MMOL/L (ref 7–15)
ATRIAL RATE - MUSE: 64 BPM
BASOPHILS # BLD AUTO: 0.1 10E3/UL (ref 0–0.2)
BASOPHILS NFR BLD AUTO: 1 %
BUN SERPL-MCNC: 9.9 MG/DL (ref 6–20)
CALCIUM SERPL-MCNC: 8.8 MG/DL (ref 8.6–10)
CHLORIDE SERPL-SCNC: 102 MMOL/L (ref 98–107)
CREAT SERPL-MCNC: 0.6 MG/DL (ref 0.51–0.95)
DEPRECATED HCO3 PLAS-SCNC: 21 MMOL/L (ref 22–29)
DIASTOLIC BLOOD PRESSURE - MUSE: NORMAL MMHG
EGFRCR SERPLBLD CKD-EPI 2021: >90 ML/MIN/1.73M2
EOSINOPHIL # BLD AUTO: 0.8 10E3/UL (ref 0–0.7)
EOSINOPHIL NFR BLD AUTO: 10 %
ERYTHROCYTE [DISTWIDTH] IN BLOOD BY AUTOMATED COUNT: 12.6 % (ref 10–15)
FLUAV RNA SPEC QL NAA+PROBE: NEGATIVE
FLUBV RNA RESP QL NAA+PROBE: NEGATIVE
GLUCOSE SERPL-MCNC: 81 MG/DL (ref 70–99)
HCG SERPL QL: NEGATIVE
HCG UR QL: NEGATIVE
HCT VFR BLD AUTO: 44.5 % (ref 35–47)
HGB BLD-MCNC: 15 G/DL (ref 11.7–15.7)
IMM GRANULOCYTES # BLD: 0 10E3/UL
IMM GRANULOCYTES NFR BLD: 0 %
INTERNAL QC OK POCT: NORMAL
INTERPRETATION ECG - MUSE: NORMAL
LYMPHOCYTES # BLD AUTO: 2.7 10E3/UL (ref 0.8–5.3)
LYMPHOCYTES NFR BLD AUTO: 30 %
MCH RBC QN AUTO: 31.5 PG (ref 26.5–33)
MCHC RBC AUTO-ENTMCNC: 33.7 G/DL (ref 31.5–36.5)
MCV RBC AUTO: 94 FL (ref 78–100)
MONOCYTES # BLD AUTO: 0.6 10E3/UL (ref 0–1.3)
MONOCYTES NFR BLD AUTO: 6 %
NEUTROPHILS # BLD AUTO: 4.7 10E3/UL (ref 1.6–8.3)
NEUTROPHILS NFR BLD AUTO: 53 %
NRBC # BLD AUTO: 0 10E3/UL
NRBC BLD AUTO-RTO: 0 /100
P AXIS - MUSE: 39 DEGREES
PLATELET # BLD AUTO: 328 10E3/UL (ref 150–450)
POCT KIT EXPIRATION DATE: NORMAL
POCT KIT LOT NUMBER: NORMAL
POTASSIUM SERPL-SCNC: 3.7 MMOL/L (ref 3.4–5.3)
PR INTERVAL - MUSE: 154 MS
QRS DURATION - MUSE: 78 MS
QT - MUSE: 396 MS
QTC - MUSE: 408 MS
R AXIS - MUSE: 52 DEGREES
RBC # BLD AUTO: 4.76 10E6/UL (ref 3.8–5.2)
RSV RNA SPEC NAA+PROBE: NEGATIVE
SARS-COV-2 RNA RESP QL NAA+PROBE: POSITIVE
SODIUM SERPL-SCNC: 135 MMOL/L (ref 135–145)
SYSTOLIC BLOOD PRESSURE - MUSE: NORMAL MMHG
T AXIS - MUSE: 37 DEGREES
VENTRICULAR RATE- MUSE: 64 BPM
WBC # BLD AUTO: 8.8 10E3/UL (ref 4–11)

## 2024-01-08 PROCEDURE — 93010 ELECTROCARDIOGRAM REPORT: CPT | Performed by: PHYSICIAN ASSISTANT

## 2024-01-08 PROCEDURE — 93005 ELECTROCARDIOGRAM TRACING: CPT | Performed by: PHYSICIAN ASSISTANT

## 2024-01-08 PROCEDURE — 80048 BASIC METABOLIC PNL TOTAL CA: CPT | Performed by: PHYSICIAN ASSISTANT

## 2024-01-08 PROCEDURE — 87637 SARSCOV2&INF A&B&RSV AMP PRB: CPT | Mod: 59 | Performed by: PHYSICIAN ASSISTANT

## 2024-01-08 PROCEDURE — 36415 COLL VENOUS BLD VENIPUNCTURE: CPT | Performed by: PHYSICIAN ASSISTANT

## 2024-01-08 PROCEDURE — 84703 CHORIONIC GONADOTROPIN ASSAY: CPT | Performed by: PHYSICIAN ASSISTANT

## 2024-01-08 PROCEDURE — 250N000012 HC RX MED GY IP 250 OP 636 PS 637: Performed by: PHYSICIAN ASSISTANT

## 2024-01-08 PROCEDURE — 94640 AIRWAY INHALATION TREATMENT: CPT | Performed by: PHYSICIAN ASSISTANT

## 2024-01-08 PROCEDURE — 71046 X-RAY EXAM CHEST 2 VIEWS: CPT

## 2024-01-08 PROCEDURE — 99284 EMERGENCY DEPT VISIT MOD MDM: CPT | Mod: 25 | Performed by: PHYSICIAN ASSISTANT

## 2024-01-08 PROCEDURE — 85025 COMPLETE CBC W/AUTO DIFF WBC: CPT | Performed by: PHYSICIAN ASSISTANT

## 2024-01-08 PROCEDURE — 250N000009 HC RX 250: Performed by: PHYSICIAN ASSISTANT

## 2024-01-08 PROCEDURE — 99285 EMERGENCY DEPT VISIT HI MDM: CPT | Mod: 25 | Performed by: PHYSICIAN ASSISTANT

## 2024-01-08 PROCEDURE — 81025 URINE PREGNANCY TEST: CPT | Performed by: PHYSICIAN ASSISTANT

## 2024-01-08 RX ORDER — IPRATROPIUM BROMIDE AND ALBUTEROL SULFATE 2.5; .5 MG/3ML; MG/3ML
3 SOLUTION RESPIRATORY (INHALATION) ONCE
Status: COMPLETED | OUTPATIENT
Start: 2024-01-08 | End: 2024-01-08

## 2024-01-08 RX ORDER — PREDNISONE 20 MG/1
TABLET ORAL
Qty: 8 TABLET | Refills: 0 | Status: SHIPPED | OUTPATIENT
Start: 2024-01-08

## 2024-01-08 RX ORDER — ALBUTEROL SULFATE 90 UG/1
2 AEROSOL, METERED RESPIRATORY (INHALATION) EVERY 6 HOURS PRN
Qty: 18 G | Refills: 0 | Status: SHIPPED | OUTPATIENT
Start: 2024-01-08

## 2024-01-08 RX ORDER — PREDNISONE 20 MG/1
40 TABLET ORAL ONCE
Status: COMPLETED | OUTPATIENT
Start: 2024-01-08 | End: 2024-01-08

## 2024-01-08 RX ADMIN — PREDNISONE 40 MG: 20 TABLET ORAL at 19:39

## 2024-01-08 RX ADMIN — IPRATROPIUM BROMIDE AND ALBUTEROL SULFATE 3 ML: .5; 3 SOLUTION RESPIRATORY (INHALATION) at 19:39

## 2024-01-08 ASSESSMENT — ACTIVITIES OF DAILY LIVING (ADL)
ADLS_ACUITY_SCORE: 35
ADLS_ACUITY_SCORE: 35

## 2024-01-08 NOTE — Clinical Note
Katharine Piña was seen and treated in our emergency department on 1/8/2024.  She may return to work on 01/10/2024.  Patient is day home from work until feeling better for 24 hours, 5 days since symptom onset at which point she can wear tight fitting mask for 5 days.     If you have any questions or concerns, please don't hesitate to call.      Padmini Coy, KALYANI

## 2024-01-09 NOTE — ED PROVIDER NOTES
ED Provider Note  [facility]      History     Chief Complaint   Patient presents with    Shortness of Breath     Patient present due to shortness of breath. Patient was recently diagnosed with COVID 3 days ago. Patient also has history of asthma. Patient has not been able to sleep and concerns about increased shortness of breath.      VANESA Piña is a 23 year old female past medical history significant for asthma  Albuterol and Wixela who presents the emergency department today with concerns for dyspnea.    Patient presents with her significant other.  She states that Thursday night she started experiencing some generalized malaise.  Into Friday she started Strensiq new onset nasal congestion, cough, sore throat and has been dealing with some dyspnea on exertion and chest tightness since that time.  She notes 2 positive home COVID test.  She notes subjective fevers with her symptoms.  She describes her dyspnea mainly when she is exerting herself and lying flat.  She states she is not having any associated nausea vomiting diarrhea abdominal pain or any urinary symptoms.  She has a history of asthma and states she did recently run out of her albuterol inhaler which she had been using up until coming in.  She states she is a smoker although has not recently had any cigarettes.  She has had 2 or 3 COVID vaccines she notes, per EMR patient has had 2 vaccines.  Per patient symptoms feel similar to prior asthma exacerbations.    Patient is no history of PE or DVT no leg swelling no hemoptysis she is not on hormonal medication.  No recent surgeries or immobilizations.  No history of heart problems in the past.    Past Medical History  Past Medical History:   Diagnosis Date    Uncomplicated asthma      No past surgical history on file.    albuterol (PROAIR HFA/PROVENTIL HFA/VENTOLIN HFA) 108 (90 Base) MCG/ACT inhaler  albuterol (PROAIR HFA/PROVENTIL HFA/VENTOLIN HFA) 108 (90 Base) MCG/ACT  "inhaler  Fexofenadine HCl (ALLEGRA PO)  fluticasone-salmeterol (WIXELA INHUB) 100-50 MCG/ACT inhaler  predniSONE (DELTASONE) 20 MG tablet      No Known Allergies    Family History  No family history on file.    Social History   Social History     Tobacco Use    Smoking status: Never    Smokeless tobacco: Never   Substance Use Topics    Alcohol use: Yes     Comment: occasional    Drug use: Yes     Types: Marijuana         A medically appropriate review of systems was performed with pertinent positives and negatives noted in the HPI, and all other systems negative.    Physical Exam   BP: 122/80  Pulse: 62  Temp: 98.5  F (36.9  C)  Resp: 18  Height: 157.5 cm (5' 2\")  Weight: 85.3 kg (188 lb)  SpO2: 98 %  Physical Exam    GENERAL APPEARANCE: The patient is well developed, well appearing, and in no acute distress.  HEAD:  Normocephalic and atraumatic.   EENT: Voice normal.  TMs without erythema or bulging bilaterally.  Uvula midline with no exudates.  NECK: Trachea is midline.No lymphadenopathy or tenderness.  LUNGS: Breath sounds are equal and clear bilaterally.  Patient has faint expiratory wheezing localized to the right middle lung field, without other wheezing or crackles noted.  No accessory muscle use or increased work of breathing no tachypnea.  HEART: Regular rate and normal rhythm.    ABDOMEN: Soft, flat, and benign. No mass, tenderness, guarding, or rebound.Bowel sounds are present.  EXTREMITIES: No cyanosis, clubbing, or edema.  NEUROLOGIC: No focal sensory or motor deficits are noted.  PSYCHIATRIC: The patient is awake, alert.  Appropriate mood and affect.  SKIN: Warm, dry, and well perfused. Good turgor.      ED Course, Procedures, & Data         EKG 1/8/2024: Normal sinus rhythm with sinus arrhythmia, ventricular rate 64 QTc 408.  When interpreted by myself, rhythm is regular.  There is a P wave before every QRS complex.  There is no visible ST elevation or depression to suggest ischemia.  No prior EKGs " for comparison.     Results for orders placed or performed during the hospital encounter of 01/08/24   Chest XR,  PA & LAT     Status: None    Narrative    EXAM: XR CHEST 2 VIEWS  LOCATION: [facility]  DATE: 1/8/2024    INDICATION: Asthma, COVID positive at home, evaluate infiltrate  COMPARISON: 12/06/2023      Impression    IMPRESSION: Negative chest.   Basic metabolic panel     Status: Abnormal   Result Value Ref Range    Sodium 135 135 - 145 mmol/L    Potassium 3.7 3.4 - 5.3 mmol/L    Chloride 102 98 - 107 mmol/L    Carbon Dioxide (CO2) 21 (L) 22 - 29 mmol/L    Anion Gap 12 7 - 15 mmol/L    Urea Nitrogen 9.9 6.0 - 20.0 mg/dL    Creatinine 0.60 0.51 - 0.95 mg/dL    GFR Estimate >90 >60 mL/min/1.73m2    Calcium 8.8 8.6 - 10.0 mg/dL    Glucose 81 70 - 99 mg/dL   Symptomatic Influenza A/B, RSV, & SARS-CoV2 PCR (COVID-19) Nose     Status: Abnormal    Specimen: Nose; Swab   Result Value Ref Range    Influenza A PCR Negative Negative    Influenza B PCR Negative Negative    RSV PCR Negative Negative    SARS CoV2 PCR Positive (A) Negative    Narrative    Testing was performed using the Xpert Xpress CoV2/Flu/RSV Assay on the [employer] GeneXpert Instrument. This test should be ordered for the detection of SARS-CoV-2, influenza, and RSV viruses in individuals who meet clinical and/or epidemiological criteria. Test performance is unknown in asymptomatic patients. This test is for in vitro diagnostic use under the FDA EUA for laboratories certified under CLIA to perform high or moderate complexity testing. This test has not been FDA cleared or approved. A negative result does not rule out the presence of PCR inhibitors in the specimen or target RNA in concentration below the limit of detection for the assay. If only one viral target is positive but coinfection with multiple targets is suspected, the sample should be re-tested with another FDA cleared, approved, or authorized test, if  coinfection would change clinical management. This test was validated by the [facility]. These laboratories are certified under the Clinical Laboratory Improvement Amendments of 1988 (CLIA-88) as qualified to perform high complexity laboratory testing.   HCG qualitative pregnancy (blood)     Status: Normal   Result Value Ref Range    hCG Serum Qualitative Negative Negative   CBC with platelets and differential     Status: Abnormal   Result Value Ref Range    WBC Count 8.8 4.0 - 11.0 10e3/uL    RBC Count 4.76 3.80 - 5.20 10e6/uL    Hemoglobin 15.0 11.7 - 15.7 g/dL    Hematocrit 44.5 35.0 - 47.0 %    MCV 94 78 - 100 fL    MCH 31.5 26.5 - 33.0 pg    MCHC 33.7 31.5 - 36.5 g/dL    RDW 12.6 10.0 - 15.0 %    Platelet Count 328 150 - 450 10e3/uL    % Neutrophils 53 %    % Lymphocytes 30 %    % Monocytes 6 %    % Eosinophils 10 %    % Basophils 1 %    % Immature Granulocytes 0 %    NRBCs per 100 WBC 0 <1 /100    Absolute Neutrophils 4.7 1.6 - 8.3 10e3/uL    Absolute Lymphocytes 2.7 0.8 - 5.3 10e3/uL    Absolute Monocytes 0.6 0.0 - 1.3 10e3/uL    Absolute Eosinophils 0.8 (H) 0.0 - 0.7 10e3/uL    Absolute Basophils 0.1 0.0 - 0.2 10e3/uL    Absolute Immature Granulocytes 0.0 <=0.4 10e3/uL    Absolute NRBCs 0.0 10e3/uL   EKG 12 lead     Status: None   Result Value Ref Range    Systolic Blood Pressure  mmHg    Diastolic Blood Pressure  mmHg    Ventricular Rate 64 BPM    Atrial Rate 64 BPM    NM Interval 154 ms    QRS Duration 78 ms     ms    QTc 408 ms    P Axis 39 degrees    R AXIS 52 degrees    T Axis 37 degrees    Interpretation ECG       Sinus rhythm with sinus arrhythmia  Normal ECG  Unconfirmed report - interpretation of this ECG is computer generated - see medical record for final interpretation  Confirmed by - EMERGENCY ROOM, PHYSICIAN ([id number]),  CHAPIN GONZALEZ ([id number]) on 1/8/2024 9:21:32 PM     hCG qual urine POCT     Status: Normal   Result Value Ref Range    HCG Qual Urine Negative  Negative    Internal QC Check POCT Valid Valid    POCT Kit Lot Number [id number]     POCT Kit Expiration Date 2112585    CBC with platelets differential     Status: Abnormal    Narrative    The following orders were created for panel order CBC with platelets differential.  Procedure                               Abnormality         Status                     ---------                               -----------         ------                     CBC with platelets and d...[[id number]]  Abnormal            Final result                 Please view results for these tests on the individual orders.     Medications   ipratropium - albuterol 0.5 mg/2.5 mg/3 mL (DUONEB) neb solution 3 mL (3 mLs Nebulization $Given 1/8/24 1939)   predniSONE (DELTASONE) tablet 40 mg (40 mg Oral $Given 1/8/24 1939)     Labs Ordered and Resulted from Time of ED Arrival to Time of ED Departure   BASIC METABOLIC PANEL - Abnormal       Result Value    Sodium 135      Potassium 3.7      Chloride 102      Carbon Dioxide (CO2) 21 (*)     Anion Gap 12      Urea Nitrogen 9.9      Creatinine 0.60      GFR Estimate >90      Calcium 8.8      Glucose 81     INFLUENZA A/B, RSV, & SARS-COV2 PCR - Abnormal    Influenza A PCR Negative      Influenza B PCR Negative      RSV PCR Negative      SARS CoV2 PCR Positive (*)    CBC WITH PLATELETS AND DIFFERENTIAL - Abnormal    WBC Count 8.8      RBC Count 4.76      Hemoglobin 15.0      Hematocrit 44.5      MCV 94      MCH 31.5      MCHC 33.7      RDW 12.6      Platelet Count 328      % Neutrophils 53      % Lymphocytes 30      % Monocytes 6      % Eosinophils 10      % Basophils 1      % Immature Granulocytes 0      NRBCs per 100 WBC 0      Absolute Neutrophils 4.7      Absolute Lymphocytes 2.7      Absolute Monocytes 0.6      Absolute Eosinophils 0.8 (*)     Absolute Basophils 0.1      Absolute Immature Granulocytes 0.0      Absolute NRBCs 0.0     HCG QUALITATIVE PREGNANCY - Normal    hCG Serum Qualitative Negative     HCG  QUALITATIVE URINE POCT - Normal    HCG Qual Urine Negative      Internal QC Check POCT Valid      POCT Kit Lot Number [id number]      POCT Kit Expiration Date 2767028       Chest XR,  PA & LAT   Final Result   IMPRESSION: Negative chest.             Critical care was not performed.     Medical Decision Making  The patient's presentation was of high complexity (a chronic illness severe exacerbation, progression, or side effect of treatment).    The patient's evaluation involved:  ordering and/or review of 3+ test(s) in this encounter (see separate area of note for details)  independent interpretation of testing performed by another health professional (chest x-ray)    The patient's management necessitated moderate risk (prescription drug management including medications given in the ED).    Assessment & Plan    This is a 23-year-old female presenting with concerns for dyspnea on exertion cough and URI type symptoms in the setting of recent home COVID test and history of asthma.  On presentation patient normoxic on room air she is not febrile or tachycardic.  She has faint expiratory wheezing noted to the right middle lung field with otherwise soft abdomen otherwise reassuring respiratory exam.  With patient's age past medical history broad differential considered includes possibility of viral URI pneumonia as well as cardiac etiology, pulmonary embolism.  Patient is PERC negative no additional workup necessary to evaluate for possibility of PE.  Low suspicion of ACS on differential.  Will obtain screening EKG basic labs pregnancy test formal COVID test and chest x-ray.  Patient has not had albuterol recently she will be given a DuoNeb here in addition to single dose of prednisone p.o. she has been on prednisone burst course in the past with improvement of her symptoms.  Patient would potentially be a candidate for Paxlovid with her history of obesity and asthma.    EKG obtained reviewed shows no findings for ischemia or  other arrhythmias.  CBC without leukocytosis or anemia.  Chemistry within normal limits including creatinine 0.60.  Pregnancy obtained and is negative.  Chest x-ray independently interpreted by myself do not see any obvious consolidation radiologist overread is negative.  Subsequent COVID test returns positive consistent with patient's history.    On reevaluation after DuoNeb and prednisone patient states she feels improved with her breathing, continues to have some faint expiratory wheezes in the right lung field.  At this point in time I do feel patient is safe to discharge home.  She has not been hypoxic while here in the ED.  With her history of obesity and asthma she is a candidate for Paxlovid discussed with her risks and benefits and she is agreeable to Paxlovid.  She will be given outpatient prescriptions for Paxlovid albuterol and remainder prednisone burst course for more days.  She was also given portable pulse oximeter.  We discussed close return precautions.  Patient has no other questions or concerns at this time.  Will place referral to follow-up with primary care.  Red flag signs were addressed, and they were in agreement with the patient care plan provided.    Patient seen and discussed with attending physician , who agrees with my plan of care.    I have reviewed the nursing notes. I have reviewed the findings, diagnosis, plan and need for follow up with the patient.    Discharge Medication List as of 1/8/2024  9:44 PM        START taking these medications    Details   !! albuterol (PROAIR HFA/PROVENTIL HFA/VENTOLIN HFA) 108 (90 Base) MCG/ACT inhaler Inhale 2 puffs into the lungs every 6 hours as needed for shortness of breath, wheezing or cough, Disp-18 g, R-0, Local PrintPharmacy may dispense brand covered by insurance (Proair, or proventil or ventolin or generic albuterol inhaler)      nirmatrelvir and ritonavir (PAXLOVID) 300 mg/100 mg therapy pack Take 3 tablets by mouth 2 times  daily for 5 days, Disp-30 tablet, R-0, Local PrintDate of symptom onset: 1/4/2023; Risk criteria met: Yes; Weight >40 kg Yes; Renal fxn: normal;  Drug-Drug interactions reviewed & addressed: Yes      predniSONE (DELTASONE) 20 MG tablet Take two tablets (= 40mg) each day for 4 (four) days, Disp-8 tablet, R-0, Local Print       !! - Potential duplicate medications found. Please discuss with provider.        Orders Placed This Encounter   Procedures    Chest XR,  PA & LAT    Basic metabolic panel    Symptomatic Influenza A/B, RSV, & SARS-CoV2 PCR (COVID-19) Nose    HCG qualitative pregnancy (blood)    CBC with platelets and differential    Primary Care Referral    EKG 12 lead    hCG qual urine POCT    CBC with platelets differential       Final diagnoses:   Mild intermittent asthma with exacerbation   COVID-19 virus infection     Here in the emergency room you have reassuring evaluation.  Your x-ray shows no findings for pneumonia.  Your basic labs are reassuring.  Your COVID test was positive.  Your improvement of symptoms with DuoNeb and prednisone.  We discussed Paxlovid and you are interested in this.  You are given prescriptions for prednisone, albuterol inhaler, and Paxlovid.    We discussed starting the Paxlovid soon as possible, you need to start this within 5 days of your symptoms starting.  We discussed pushing fluids Tylenol and ibuprofen, honey and tea.  You are given a pulse oximeter and recommended to continue monitoring her oxygen levels returning back to the emergency department he develop any problems with breathing low oxygen less than 90,, any other new or worsening symptoms.  I gave you a work note.  I expect to be feeling better within the next 3 to 4 days.    If you develop any new or worsening symptoms, is important to return right away to the emergency department for further evaluation and management.      I will place referral to follow-up with primary care and they should be calling to  schedule a follow-up visit.    Work note given.      SILVESTRE Cao  [facility] EMERGENCY DEPARTMENT  1/8/2024      This data collected with the PA working in the Emergency Department.  Patient was seen and evaluated by myself and I repeated the history and physical exam with the patient.  The plan of care was discussed with them.  The key portions of the note including the entire assessment and plan reflect my documentation.      Eric Palmer MD, Eric Boston MD  01/14/24 1212

## 2024-01-09 NOTE — ED NOTES
Received Report on pt with SOB, pt  is in room, resting on bed, she is AOX4, VSS, NAD, VSS, skin is intact, ambulate with steady gait, denies anything at this time, family at bedside, will cont to monitor.

## 2024-01-09 NOTE — ED TRIAGE NOTES
Patient present due to shortness of breath. Patient was recently diagnosed with COVID 3 days ago. Patient also has history of asthma. Patient has not been able to sleep and concerns about increased shortness of breath.      Triage Assessment (Adult)       Row Name 01/08/24 2594          Triage Assessment    Airway WDL WDL        Respiratory WDL    Respiratory WDL X;rhythm/pattern     Rhythm/Pattern, Respiratory shortness of breath        Skin Circulation/Temperature WDL    Skin Circulation/Temperature WDL WDL        Cardiac WDL    Cardiac WDL WDL        Peripheral/Neurovascular WDL    Peripheral Neurovascular WDL WDL        Cognitive/Neuro/Behavioral WDL    Cognitive/Neuro/Behavioral WDL WDL

## 2024-01-09 NOTE — ED NOTES
Pt is discharge from ED to home, discharge instructions provided to pt, she verbalized understanding, pt ambulate from ED in stable condition with all belongings.

## 2024-01-09 NOTE — DISCHARGE INSTRUCTIONS
Here in the emergency room you have reassuring evaluation.  Your x-ray shows no findings for pneumonia.  Your basic labs are reassuring.  Your COVID test was positive.  Your improvement of symptoms with DuoNeb and prednisone.  We discussed Paxlovid and you are interested in this.  You are given prescriptions for prednisone, albuterol inhaler, and Paxlovid.    We discussed starting the Paxlovid soon as possible, you need to start this within 5 days of your symptoms starting.  We discussed pushing fluids Tylenol and ibuprofen, honey and tea.  You are given a pulse oximeter and recommended to continue monitoring her oxygen levels returning back to the emergency department he develop any problems with breathing low oxygen less than 90,, any other new or worsening symptoms.  I gave you a work note.  I expect to be feeling better within the next 3 to 4 days.    If you develop any new or worsening symptoms, is important to return right away to the emergency department for further evaluation and management.      I will place referral to follow-up with primary care and they should be calling to schedule a follow-up visit.

## 2024-08-06 ENCOUNTER — HOSPITAL ENCOUNTER (EMERGENCY)
Facility: CLINIC | Age: 24
Discharge: HOME | End: 2024-08-07
Attending: EMERGENCY MEDICINE | Admitting: EMERGENCY MEDICINE
Payer: COMMERCIAL

## 2024-08-06 ENCOUNTER — APPOINTMENT (OUTPATIENT)
Dept: GENERAL RADIOLOGY | Facility: CLINIC | Age: 24
End: 2024-08-06
Attending: EMERGENCY MEDICINE
Payer: COMMERCIAL

## 2024-08-06 DIAGNOSIS — J06.9 UPPER RESPIRATORY TRACT INFECTION, UNSPECIFIED TYPE: ICD-10-CM

## 2024-08-06 LAB
FLUAV RNA SPEC QL NAA+PROBE: NEGATIVE
FLUBV RNA RESP QL NAA+PROBE: NEGATIVE
HCG UR QL: NEGATIVE
RSV RNA SPEC NAA+PROBE: NEGATIVE
SARS-COV-2 RNA RESP QL NAA+PROBE: NEGATIVE

## 2024-08-06 PROCEDURE — 99284 EMERGENCY DEPT VISIT MOD MDM: CPT | Performed by: EMERGENCY MEDICINE

## 2024-08-06 PROCEDURE — 87637 SARSCOV2&INF A&B&RSV AMP PRB: CPT | Performed by: EMERGENCY MEDICINE

## 2024-08-06 PROCEDURE — 71046 X-RAY EXAM CHEST 2 VIEWS: CPT

## 2024-08-06 PROCEDURE — 94640 AIRWAY INHALATION TREATMENT: CPT | Performed by: EMERGENCY MEDICINE

## 2024-08-06 PROCEDURE — 96361 HYDRATE IV INFUSION ADD-ON: CPT | Performed by: EMERGENCY MEDICINE

## 2024-08-06 PROCEDURE — 99284 EMERGENCY DEPT VISIT MOD MDM: CPT | Mod: 25 | Performed by: EMERGENCY MEDICINE

## 2024-08-06 PROCEDURE — 258N000003 HC RX IP 258 OP 636: Performed by: EMERGENCY MEDICINE

## 2024-08-06 PROCEDURE — 81025 URINE PREGNANCY TEST: CPT | Performed by: EMERGENCY MEDICINE

## 2024-08-06 PROCEDURE — 250N000013 HC RX MED GY IP 250 OP 250 PS 637: Performed by: EMERGENCY MEDICINE

## 2024-08-06 RX ORDER — ALBUTEROL SULFATE 90 UG/1
2 AEROSOL, METERED RESPIRATORY (INHALATION) ONCE
Status: COMPLETED | OUTPATIENT
Start: 2024-08-06 | End: 2024-08-06

## 2024-08-06 RX ORDER — METOCLOPRAMIDE HYDROCHLORIDE 5 MG/ML
5 INJECTION INTRAMUSCULAR; INTRAVENOUS ONCE
Status: COMPLETED | OUTPATIENT
Start: 2024-08-06 | End: 2024-08-07

## 2024-08-06 RX ORDER — KETOROLAC TROMETHAMINE 15 MG/ML
10 INJECTION, SOLUTION INTRAMUSCULAR; INTRAVENOUS ONCE
Status: COMPLETED | OUTPATIENT
Start: 2024-08-06 | End: 2024-08-07

## 2024-08-06 RX ORDER — DIPHENHYDRAMINE HYDROCHLORIDE 50 MG/ML
25 INJECTION INTRAMUSCULAR; INTRAVENOUS ONCE
Status: COMPLETED | OUTPATIENT
Start: 2024-08-06 | End: 2024-08-07

## 2024-08-06 RX ADMIN — SODIUM CHLORIDE 1000 ML: 9 INJECTION, SOLUTION INTRAVENOUS at 22:38

## 2024-08-06 RX ADMIN — ALBUTEROL SULFATE 2 PUFF: 90 AEROSOL, METERED RESPIRATORY (INHALATION) at 22:50

## 2024-08-06 ASSESSMENT — COLUMBIA-SUICIDE SEVERITY RATING SCALE - C-SSRS
1. IN THE PAST MONTH, HAVE YOU WISHED YOU WERE DEAD OR WISHED YOU COULD GO TO SLEEP AND NOT WAKE UP?: NO
2. HAVE YOU ACTUALLY HAD ANY THOUGHTS OF KILLING YOURSELF IN THE PAST MONTH?: NO
6. HAVE YOU EVER DONE ANYTHING, STARTED TO DO ANYTHING, OR PREPARED TO DO ANYTHING TO END YOUR LIFE?: NO

## 2024-08-06 ASSESSMENT — ACTIVITIES OF DAILY LIVING (ADL)
ADLS_ACUITY_SCORE: 33
ADLS_ACUITY_SCORE: 35

## 2024-08-06 NOTE — LETTER
August 7, 2024      To Whom It May Concern:      Katharine Piña was seen in our Emergency Department today, 08/07/24.  I expect her condition to improve over the next several days.  She may return to work when improved.    Sincerely,        Hola Preciado, DO

## 2024-08-07 VITALS
TEMPERATURE: 98.4 F | HEART RATE: 85 BPM | SYSTOLIC BLOOD PRESSURE: 117 MMHG | OXYGEN SATURATION: 98 % | RESPIRATION RATE: 17 BRPM | DIASTOLIC BLOOD PRESSURE: 76 MMHG

## 2024-08-07 PROCEDURE — 96375 TX/PRO/DX INJ NEW DRUG ADDON: CPT | Performed by: EMERGENCY MEDICINE

## 2024-08-07 PROCEDURE — 250N000011 HC RX IP 250 OP 636: Performed by: EMERGENCY MEDICINE

## 2024-08-07 PROCEDURE — 96374 THER/PROPH/DIAG INJ IV PUSH: CPT | Performed by: EMERGENCY MEDICINE

## 2024-08-07 RX ORDER — FLUTICASONE PROPIONATE AND SALMETEROL 100; 50 UG/1; UG/1
1 POWDER RESPIRATORY (INHALATION) 2 TIMES DAILY
Qty: 1 EACH | Refills: 1 | Status: SHIPPED | OUTPATIENT
Start: 2024-08-07

## 2024-08-07 RX ORDER — PREDNISONE 10 MG/1
TABLET ORAL
Qty: 7 TABLET | Refills: 0 | Status: SHIPPED | OUTPATIENT
Start: 2024-08-07 | End: 2024-08-13

## 2024-08-07 RX ORDER — ALBUTEROL SULFATE 90 UG/1
2 AEROSOL, METERED RESPIRATORY (INHALATION) EVERY 6 HOURS PRN
Qty: 18 G | Refills: 0 | Status: SHIPPED | OUTPATIENT
Start: 2024-08-07

## 2024-08-07 RX ADMIN — DIPHENHYDRAMINE HYDROCHLORIDE 25 MG: 50 INJECTION, SOLUTION INTRAMUSCULAR; INTRAVENOUS at 00:15

## 2024-08-07 RX ADMIN — KETOROLAC TROMETHAMINE 10 MG: 15 INJECTION, SOLUTION INTRAMUSCULAR; INTRAVENOUS at 00:14

## 2024-08-07 RX ADMIN — METOCLOPRAMIDE HYDROCHLORIDE 5 MG: 5 INJECTION INTRAMUSCULAR; INTRAVENOUS at 00:12

## 2024-08-07 ASSESSMENT — ACTIVITIES OF DAILY LIVING (ADL): ADLS_ACUITY_SCORE: 35

## 2024-08-07 NOTE — ED TRIAGE NOTES
Triage Assessment (Adult)       Row Name 08/06/24 2111          Triage Assessment    Airway WDL WDL        Respiratory WDL    Respiratory WDL X;cough     Cough Frequency frequent        Skin Circulation/Temperature WDL    Skin Circulation/Temperature WDL WDL        Cardiac WDL    Cardiac WDL WDL        Peripheral/Neurovascular WDL    Peripheral Neurovascular WDL WDL        Cognitive/Neuro/Behavioral WDL    Cognitive/Neuro/Behavioral WDL WDL

## 2024-08-07 NOTE — ED PROVIDER NOTES
[facility] EMERGENCY DEPARTMENT ([facility])    8/06/24      ED PROVIDER NOTE    History     Chief Complaint   Patient presents with    Flu Symptoms     Onset was yesterday and patient states its hard for her to sleep. Patient had no relief from taking dayquil. Hx of ashma.      HPI  Katharine Piña is a 23 year old female with PMHx notable for asthma who presents to the ED for evaluation of flu symptoms. Patient reports runny nose, congestion, productive cough, headache, and stomach pain. Symptoms began in past couple days with runny nose and stomach pain starting yesterday. She reports possible sick contact of coworker who has COVID. She states symptoms feel similar to when she has had COVID in the past. No other symptoms noted.    Past Medical History  Past Medical History:   Diagnosis Date    Uncomplicated asthma      No past surgical history on file.  albuterol (PROAIR HFA/PROVENTIL HFA/VENTOLIN HFA) 108 (90 Base) MCG/ACT inhaler  albuterol (PROAIR HFA/PROVENTIL HFA/VENTOLIN HFA) 108 (90 Base) MCG/ACT inhaler  predniSONE (DELTASONE) 20 MG tablet  Fexofenadine HCl (ALLEGRA PO)  fluticasone-salmeterol (WIXELA INHUB) 100-50 MCG/ACT inhaler      No Known Allergies  Family History  No family history on file.  Social History   Social History     Tobacco Use    Smoking status: Never    Smokeless tobacco: Never   Substance Use Topics    Alcohol use: Yes     Comment: occasional    Drug use: Yes     Types: Marijuana      Past medical history, past surgical history, medications, allergies, family history, and social history were reviewed with the patient. No additional pertinent items.     A complete review of systems was performed with pertinent positives and negatives noted in the HPI, and all other systems negative.    Physical Exam   BP: 137/81  Pulse: 106  Temp: 99.6  F (37.6  C)  Resp: 16  SpO2: 97 %  Physical Exam  Vitals and nursing note reviewed.   Constitutional:       General: She is not in acute  distress.     Appearance: Normal appearance. She is not diaphoretic.   HENT:      Head: Atraumatic.      Nose: Congestion present.      Mouth/Throat:      Mouth: Mucous membranes are moist.   Eyes:      General: No scleral icterus.     Conjunctiva/sclera: Conjunctivae normal.   Cardiovascular:      Rate and Rhythm: Normal rate.      Heart sounds: Normal heart sounds.   Pulmonary:      Effort: No respiratory distress.      Breath sounds: Normal breath sounds.   Abdominal:      General: Abdomen is flat.   Musculoskeletal:      Cervical back: Neck supple.   Skin:     General: Skin is warm.      Findings: No rash.   Neurological:      Mental Status: She is alert.           ED Course, Procedures, & Data      Procedures                No results found for any visits on 08/06/24.  Medications - No data to display  Labs Ordered and Resulted from Time of ED Arrival to Time of ED Departure - No data to display  No orders to display              Assessment & Plan    This is a 23-year-old female who presents with URI symptoms.  She also notes headache.  Patient has a history of asthma, she also notes COVID exposure.  On exam patient has congestion.  COVID and influenza are negative.  Chest x-ray shows no acute abnormalities.  Patient was given IV fluids, ketorolac, metoclopramide, diphenhydramine, as well as albuterol.  She felt improved with this.  We will refill patient's inhaler prescription start patient on a course of prednisone.  Will discharge with return precautions.    I have reviewed the nursing notes. I have reviewed the findings, diagnosis, plan and need for follow up with the patient.    New Prescriptions    No medications on file       Final diagnoses:   None   Buddy HERNANDEZ, am serving as a trained medical scribe to document services personally performed by Hola Preciado DO based on the provider's statements to me on August 6, 2024.  This document has been checked and approved by the attending provider.    I,  Hola Preciado DO, was physically present and have reviewed and verified the accuracy of this note documented by Buddy Albrecht medical scribe.      Hola Preciado DO  [facility] EMERGENCY DEPARTMENT  8/6/2024     Hola Preciado DO  08/07/24 0148

## 2025-03-05 ENCOUNTER — HOSPITAL ENCOUNTER (EMERGENCY)
Facility: CLINIC | Age: 25
Discharge: HOME | End: 2025-03-05

## 2025-03-05 VITALS
WEIGHT: 192 LBS | TEMPERATURE: 98.1 F | BODY MASS INDEX: 35.12 KG/M2 | RESPIRATION RATE: 16 BRPM | HEART RATE: 74 BPM | OXYGEN SATURATION: 96 % | DIASTOLIC BLOOD PRESSURE: 81 MMHG | SYSTOLIC BLOOD PRESSURE: 127 MMHG

## 2025-03-05 DIAGNOSIS — J06.9 URI (UPPER RESPIRATORY INFECTION): ICD-10-CM

## 2025-03-05 DIAGNOSIS — J45.901 ASTHMA EXACERBATION: ICD-10-CM

## 2025-03-05 PROCEDURE — 94640 AIRWAY INHALATION TREATMENT: CPT | Performed by: PHYSICIAN ASSISTANT

## 2025-03-05 PROCEDURE — 250N000013 HC RX MED GY IP 250 OP 250 PS 637: Performed by: EMERGENCY MEDICINE

## 2025-03-05 PROCEDURE — 250N000009 HC RX 250: Performed by: PHYSICIAN ASSISTANT

## 2025-03-05 PROCEDURE — 99284 EMERGENCY DEPT VISIT MOD MDM: CPT | Performed by: PHYSICIAN ASSISTANT

## 2025-03-05 PROCEDURE — 99284 EMERGENCY DEPT VISIT MOD MDM: CPT | Mod: 25 | Performed by: PHYSICIAN ASSISTANT

## 2025-03-05 RX ORDER — ALBUTEROL SULFATE 90 UG/1
2 INHALANT RESPIRATORY (INHALATION) EVERY 6 HOURS PRN
Qty: 18 G | Refills: 2 | Status: SHIPPED | OUTPATIENT
Start: 2025-03-05

## 2025-03-05 RX ORDER — IPRATROPIUM BROMIDE AND ALBUTEROL SULFATE 2.5; .5 MG/3ML; MG/3ML
3 SOLUTION RESPIRATORY (INHALATION) ONCE
Status: COMPLETED | OUTPATIENT
Start: 2025-03-05 | End: 2025-03-05

## 2025-03-05 RX ORDER — PREDNISONE 20 MG/1
TABLET ORAL
Qty: 10 TABLET | Refills: 0 | Status: SHIPPED | OUTPATIENT
Start: 2025-03-05

## 2025-03-05 RX ORDER — ALBUTEROL SULFATE 90 UG/1
2 INHALANT RESPIRATORY (INHALATION) ONCE
Status: COMPLETED | OUTPATIENT
Start: 2025-03-05 | End: 2025-03-05

## 2025-03-05 RX ADMIN — ALBUTEROL SULFATE 2 PUFF: 90 AEROSOL, METERED RESPIRATORY (INHALATION) at 15:36

## 2025-03-05 RX ADMIN — IPRATROPIUM BROMIDE AND ALBUTEROL SULFATE 3 ML: .5; 3 SOLUTION RESPIRATORY (INHALATION) at 15:43

## 2025-03-05 ASSESSMENT — COLUMBIA-SUICIDE SEVERITY RATING SCALE - C-SSRS
1. IN THE PAST MONTH, HAVE YOU WISHED YOU WERE DEAD OR WISHED YOU COULD GO TO SLEEP AND NOT WAKE UP?: NO
6. HAVE YOU EVER DONE ANYTHING, STARTED TO DO ANYTHING, OR PREPARED TO DO ANYTHING TO END YOUR LIFE?: NO
2. HAVE YOU ACTUALLY HAD ANY THOUGHTS OF KILLING YOURSELF IN THE PAST MONTH?: NO

## 2025-03-05 ASSESSMENT — ACTIVITIES OF DAILY LIVING (ADL): ADLS_ACUITY_SCORE: 41

## 2025-03-05 NOTE — ED PROVIDER NOTES
ED Provider Note  [facility]      History     Chief Complaint   Patient presents with    Asthma Exacerbation     Patient reports she recently had a cold that has exacerbated her asthma.  She no longer has insurance so has not been able to refill her inhaler.  She feels her cold symptoms have improved but could use an inhaler for intermittent shortness of breath.  She does not appear to be in any respiratory distress at this time and denies any chest pain.     HPI  25yo F pmhx asthma (no intubations or hospitalizations) p/w wheezing consistent with her asthma exacerbation.  Notably, patient states that she has been unable to obtain an inhaler and wants to speak to the insurance issues.  Most recently, states that she has had URI (congestion, rhinorrhea, nonproductive cough) which correlated with wheezing.  No fever, chills, SOB, CP.    Past Medical History  Past Medical History:   Diagnosis Date    Uncomplicated asthma      No past surgical history on file.  albuterol (PROAIR HFA/PROVENTIL HFA/VENTOLIN HFA) 108 (90 Base) MCG/ACT inhaler  albuterol (PROAIR HFA/PROVENTIL HFA/VENTOLIN HFA) 108 (90 Base) MCG/ACT inhaler  Fexofenadine HCl (ALLEGRA PO)  fluticasone-salmeterol (WIXELA INHUB) 100-50 MCG/ACT inhaler  predniSONE (DELTASONE) 20 MG tablet      No Known Allergies  Family History  No family history on file.  Social History   Social History     Tobacco Use    Smoking status: Never    Smokeless tobacco: Never   Substance Use Topics    Alcohol use: Yes     Comment: occasional    Drug use: Yes     Types: Marijuana      A medically appropriate review of systems was performed with pertinent positives and negatives noted in the HPI, and all other systems negative.    Physical Exam   BP: 127/81  Pulse: 74  Temp: 98.1  F (36.7  C)  Resp: 16  Weight: 87.1 kg (192 lb)  SpO2: 96 %  Physical Exam  Constitutional:       General: She is not in acute distress.     Appearance: Normal appearance. She is  not diaphoretic.   HENT:      Head: Atraumatic.      Mouth/Throat:      Mouth: Mucous membranes are moist.   Eyes:      Conjunctiva/sclera: Conjunctivae normal.   Cardiovascular:      Rate and Rhythm: Normal rate and regular rhythm.      Heart sounds: Normal heart sounds.   Pulmonary:      Effort: Pulmonary effort is normal. No respiratory distress.      Breath sounds: Normal breath sounds. No wheezing.      Comments: No respiratory stress  Abdominal:      General: Abdomen is flat.   Musculoskeletal:      Cervical back: Neck supple.   Skin:     General: Skin is warm.      Findings: No rash.   Neurological:      Mental Status: She is alert.           ED Course, Procedures, & Data      Procedures                No results found for any visits on 03/05/25.  Medications   ipratropium - albuterol 0.5 mg/2.5 mg/3 mL (DUONEB) neb solution 3 mL (has no administration in time range)   albuterol (PROVENTIL HFA/VENTOLIN HFA) inhaler (2 puffs Inhalation $Given 3/5/25 7461)     Labs Ordered and Resulted from Time of ED Arrival to Time of ED Departure - No data to display  No orders to display          Critical care was not performed.     Medical Decision Making  The patient's presentation was of moderate complexity (a chronic illness mild to moderate exacerbation, progression, or side effect of treatment).    The patient's evaluation involved:  strong consideration of a test (see separate area of note for details) that was ultimately deferred    The patient's management necessitated moderate risk (prescription drug management including medications given in the ED).    Assessment & Plan    23yo F pmhx asthma (no intubations or hospitalizations) p/w wheezing consistent with her prior asthma exacerbations I/s/o URI and being without her inhaler for many months 2/2 insurance issues.  No fever electrolytes, CP, SOB.  In the ED patient is HDS/AF, NAD, no respiratory stress, speaking full sentences.  She has mild, upper field  expiratory wheezes on exam.  Patient was given a DuoNeb in the ED with resolution in her wheezing, and subjective improvement in her symptoms.  Considered CXR, but as patient is afebrile, no respiratory distress, suspicion for PNA is low at this time and it was deferred.  Patient will be discharged with prescription for albuterol (patient states she is able to get up on) and low dose steroids on discharge, PCP follow-up, strict ER return precautions worsening symptoms.    I have reviewed the nursing notes. I have reviewed the findings, diagnosis, plan and need for follow up with the patient.    New Prescriptions    No medications on file       Final diagnoses:   Asthma exacerbation   URI (upper respiratory infection)         Jose Juan Srivastava PA-C  [facility] EMERGENCY DEPARTMENT  3/5/2025     Jose Juan Srivastava PA-C  03/05/25 1544

## 2025-03-05 NOTE — ED TRIAGE NOTES
Triage Assessment (Adult)       Row Name 03/05/25 1515          Triage Assessment    Airway WDL WDL        Respiratory WDL    Respiratory WDL X;cough     Cough Frequency frequent     Cough Type productive        Skin Circulation/Temperature WDL    Skin Circulation/Temperature WDL WDL        Cardiac WDL    Cardiac WDL WDL        Peripheral/Neurovascular WDL    Peripheral Neurovascular WDL WDL        Cognitive/Neuro/Behavioral WDL    Cognitive/Neuro/Behavioral WDL WDL

## 2025-03-30 ENCOUNTER — HOSPITAL ENCOUNTER (EMERGENCY)
Facility: CLINIC | Age: 25
Discharge: HOME | End: 2025-03-30
Attending: FAMILY MEDICINE | Admitting: FAMILY MEDICINE

## 2025-03-30 VITALS
WEIGHT: 201.7 LBS | OXYGEN SATURATION: 99 % | HEIGHT: 62 IN | TEMPERATURE: 98 F | HEART RATE: 90 BPM | BODY MASS INDEX: 37.12 KG/M2 | SYSTOLIC BLOOD PRESSURE: 143 MMHG | DIASTOLIC BLOOD PRESSURE: 80 MMHG | RESPIRATION RATE: 16 BRPM

## 2025-03-30 DIAGNOSIS — S61.412A LACERATION OF LEFT HAND WITHOUT FOREIGN BODY, INITIAL ENCOUNTER: ICD-10-CM

## 2025-03-30 PROCEDURE — 90715 TDAP VACCINE 7 YRS/> IM: CPT | Performed by: FAMILY MEDICINE

## 2025-03-30 PROCEDURE — 90471 IMMUNIZATION ADMIN: CPT | Performed by: FAMILY MEDICINE

## 2025-03-30 PROCEDURE — 99283 EMERGENCY DEPT VISIT LOW MDM: CPT | Mod: 25 | Performed by: FAMILY MEDICINE

## 2025-03-30 PROCEDURE — 250N000009 HC RX 250: Performed by: FAMILY MEDICINE

## 2025-03-30 PROCEDURE — 250N000011 HC RX IP 250 OP 636: Performed by: FAMILY MEDICINE

## 2025-03-30 PROCEDURE — 12001 RPR S/N/AX/GEN/TRNK 2.5CM/<: CPT | Performed by: FAMILY MEDICINE

## 2025-03-30 RX ORDER — LIDOCAINE HYDROCHLORIDE 10 MG/ML
INJECTION, SOLUTION INFILTRATION; PERINEURAL
Status: COMPLETED
Start: 2025-03-30 | End: 2025-03-30

## 2025-03-30 RX ADMIN — CLOSTRIDIUM TETANI TOXOID ANTIGEN (FORMALDEHYDE INACTIVATED), CORYNEBACTERIUM DIPHTHERIAE TOXOID ANTIGEN (FORMALDEHYDE INACTIVATED), BORDETELLA PERTUSSIS TOXOID ANTIGEN (GLUTARALDEHYDE INACTIVATED), BORDETELLA PERTUSSIS FILAMENTOUS HEMAGGLUTININ ANTIGEN (FORMALDEHYDE INACTIVATED), BORDETELLA PERTUSSIS PERTACTIN ANTIGEN, AND BORDETELLA PERTUSSIS FIMBRIAE 2/3 ANTIGEN 0.5 ML: 5; 2; 2.5; 5; 3; 5 INJECTION, SUSPENSION INTRAMUSCULAR at 18:04

## 2025-03-30 RX ADMIN — LIDOCAINE HYDROCHLORIDE: 10 INJECTION, SOLUTION INFILTRATION; PERINEURAL at 17:30

## 2025-03-30 ASSESSMENT — COLUMBIA-SUICIDE SEVERITY RATING SCALE - C-SSRS
6. HAVE YOU EVER DONE ANYTHING, STARTED TO DO ANYTHING, OR PREPARED TO DO ANYTHING TO END YOUR LIFE?: NO
2. HAVE YOU ACTUALLY HAD ANY THOUGHTS OF KILLING YOURSELF IN THE PAST MONTH?: NO
1. IN THE PAST MONTH, HAVE YOU WISHED YOU WERE DEAD OR WISHED YOU COULD GO TO SLEEP AND NOT WAKE UP?: NO

## 2025-03-30 ASSESSMENT — ACTIVITIES OF DAILY LIVING (ADL): ADLS_ACUITY_SCORE: 41

## 2025-03-30 NOTE — LETTER
March 30, 2025      To Whom It May Concern:      Katharine Piña was seen in our Emergency Department today, 03/30/25.  I expect her condition to improve over the next 1-2 days.  She may return to work when improved.    Sincerely,        Alber Truong MD  Electronically signed

## 2025-04-08 ENCOUNTER — HOSPITAL ENCOUNTER (EMERGENCY)
Facility: CLINIC | Age: 25
Discharge: HOME | End: 2025-04-08
Attending: EMERGENCY MEDICINE | Admitting: EMERGENCY MEDICINE

## 2025-04-08 VITALS
RESPIRATION RATE: 16 BRPM | HEART RATE: 78 BPM | DIASTOLIC BLOOD PRESSURE: 78 MMHG | TEMPERATURE: 98.2 F | OXYGEN SATURATION: 100 % | SYSTOLIC BLOOD PRESSURE: 118 MMHG

## 2025-04-08 DIAGNOSIS — Z48.02 VISIT FOR SUTURE REMOVAL: ICD-10-CM

## 2025-04-08 PROCEDURE — 99281 EMR DPT VST MAYX REQ PHY/QHP: CPT

## 2025-04-08 PROCEDURE — 999N000104 HC STATISTIC NO CHARGE

## 2025-04-08 ASSESSMENT — ACTIVITIES OF DAILY LIVING (ADL): ADLS_ACUITY_SCORE: 41

## 2025-04-08 NOTE — ED PROVIDER NOTES
[facility] EMERGENCY DEPARTMENT ([facility])    4/08/25      ED PROVIDER NOTE    History     Chief Complaint   Patient presents with    Suture Removal     HPI  Katharine Piña is a 24 year old female who presents to the ED for suture removal.  Patient had 4 sutures placed on 3/30 for a 2.5 cm laceration to his left hand. She feels that the wound has been healing well.  She denies any pain, no drainage, no redness.  No acute complaints or injuries.  No numbness or weakness    Past Medical History  Past Medical History:   Diagnosis Date    Uncomplicated asthma      No past surgical history on file.  albuterol (PROAIR HFA/PROVENTIL HFA/VENTOLIN HFA) 108 (90 Base) MCG/ACT inhaler  Fexofenadine HCl (ALLEGRA PO)  fluticasone-salmeterol (WIXELA INHUB) 100-50 MCG/ACT inhaler  predniSONE (DELTASONE) 20 MG tablet      No Known Allergies  Family History  No family history on file.  Social History   Social History     Tobacco Use    Smoking status: Never    Smokeless tobacco: Never   Substance Use Topics    Alcohol use: Yes     Comment: occasional    Drug use: Yes     Types: Marijuana      Past medical history, past surgical history, medications, allergies, family history, and social history were reviewed with the patient. No additional pertinent items.     A medically appropriate review of systems was performed with pertinent positives and negatives noted in the HPI, and all other systems negative.    Physical Exam      Physical Exam  Constitutional:       General: She is not in acute distress.     Appearance: She is not toxic-appearing.   HENT:      Head: Normocephalic and atraumatic.      Nose: Nose normal.      Mouth/Throat:      Mouth: Mucous membranes are moist.      Pharynx: Oropharynx is clear.   Eyes:      Conjunctiva/sclera: Conjunctivae normal.      Pupils: Pupils are equal, round, and reactive to light.   Pulmonary:      Effort: Pulmonary effort is normal.   Musculoskeletal:         General: No swelling or  deformity. Normal range of motion.      Comments: On the left hand between the first and second finger there is a healing laceration with 4 sutures in place, no redness, drainage, erythema, tenderness  Full range of motion throughout the hand, sensation intact, the hand is well-perfused, warm   Skin:     General: Skin is warm and dry.      Coloration: Skin is not pale.      Findings: No erythema or rash.   Neurological:      General: No focal deficit present.      Mental Status: She is alert and oriented to person, place, and time.           ED Course, Procedures, & Data      Procedures                No results found for any visits on 04/08/25.  Medications - No data to display  Labs Ordered and Resulted from Time of ED Arrival to Time of ED Departure - No data to display  No orders to display          Critical care was not performed.     Medical Decision Making  The patient's presentation was of straightforward complexity (a clearly self-limited or minor problem).    The patient's evaluation involved:  review of external note(s) from 1 sources (ED note)    The patient's management necessitated only low risk treatment.    Assessment & Plan    This is a 24-year-old female here for suture removal.  Patient had sutures placed on 3/30 to the left hand.  The wound appears to be healing well, no erythema, drainage, or other sign of infection.  Sutures were removed.  Patient was discharged stable condition.      I have reviewed the nursing notes. I have reviewed the findings, diagnosis, plan and need for follow up with the patient.    New Prescriptions    No medications on file       Final diagnoses:   Visit for suture removal       Amadou Hart MD  [facility] EMERGENCY DEPARTMENT  4/8/2025     Amadou Hart MD  04/08/25 8530

## 2025-04-08 NOTE — DISCHARGE INSTRUCTIONS
If you develop any redness, increased pain, or other new symptoms or concerns please return for re-evaluation.

## 2025-04-18 ENCOUNTER — HOSPITAL ENCOUNTER (EMERGENCY)
Facility: CLINIC | Age: 25
Discharge: HOME | End: 2025-04-18
Attending: FAMILY MEDICINE | Admitting: FAMILY MEDICINE

## 2025-04-18 ENCOUNTER — APPOINTMENT (OUTPATIENT)
Dept: GENERAL RADIOLOGY | Facility: CLINIC | Age: 25
End: 2025-04-18
Attending: FAMILY MEDICINE

## 2025-04-18 VITALS
WEIGHT: 191.8 LBS | HEART RATE: 88 BPM | OXYGEN SATURATION: 98 % | TEMPERATURE: 98 F | RESPIRATION RATE: 16 BRPM | DIASTOLIC BLOOD PRESSURE: 74 MMHG | BODY MASS INDEX: 35.3 KG/M2 | HEIGHT: 62 IN | SYSTOLIC BLOOD PRESSURE: 105 MMHG

## 2025-04-18 DIAGNOSIS — J45.21 MILD INTERMITTENT ASTHMA WITH EXACERBATION: ICD-10-CM

## 2025-04-18 DIAGNOSIS — J06.9 VIRAL URI: ICD-10-CM

## 2025-04-18 PROCEDURE — 71046 X-RAY EXAM CHEST 2 VIEWS: CPT

## 2025-04-18 PROCEDURE — 87637 SARSCOV2&INF A&B&RSV AMP PRB: CPT | Performed by: FAMILY MEDICINE

## 2025-04-18 PROCEDURE — 94640 AIRWAY INHALATION TREATMENT: CPT | Performed by: FAMILY MEDICINE

## 2025-04-18 PROCEDURE — 250N000009 HC RX 250: Performed by: FAMILY MEDICINE

## 2025-04-18 PROCEDURE — 99284 EMERGENCY DEPT VISIT MOD MDM: CPT | Performed by: FAMILY MEDICINE

## 2025-04-18 PROCEDURE — 99284 EMERGENCY DEPT VISIT MOD MDM: CPT | Mod: 25 | Performed by: FAMILY MEDICINE

## 2025-04-18 RX ORDER — ALBUTEROL SULFATE 90 UG/1
2 INHALANT RESPIRATORY (INHALATION) EVERY 6 HOURS PRN
Qty: 18 G | Refills: 0 | Status: SHIPPED | OUTPATIENT
Start: 2025-04-18

## 2025-04-18 RX ORDER — IPRATROPIUM BROMIDE AND ALBUTEROL SULFATE 2.5; .5 MG/3ML; MG/3ML
3 SOLUTION RESPIRATORY (INHALATION) ONCE
Status: COMPLETED | OUTPATIENT
Start: 2025-04-18 | End: 2025-04-18

## 2025-04-18 RX ORDER — ALBUTEROL SULFATE 0.83 MG/ML
2.5 SOLUTION RESPIRATORY (INHALATION) EVERY 6 HOURS PRN
Qty: 90 ML | Refills: 0 | Status: SHIPPED | OUTPATIENT
Start: 2025-04-18

## 2025-04-18 RX ADMIN — IPRATROPIUM BROMIDE AND ALBUTEROL SULFATE 3 ML: .5; 3 SOLUTION RESPIRATORY (INHALATION) at 10:34

## 2025-04-18 ASSESSMENT — COLUMBIA-SUICIDE SEVERITY RATING SCALE - C-SSRS
6. HAVE YOU EVER DONE ANYTHING, STARTED TO DO ANYTHING, OR PREPARED TO DO ANYTHING TO END YOUR LIFE?: NO
1. IN THE PAST MONTH, HAVE YOU WISHED YOU WERE DEAD OR WISHED YOU COULD GO TO SLEEP AND NOT WAKE UP?: NO
2. HAVE YOU ACTUALLY HAD ANY THOUGHTS OF KILLING YOURSELF IN THE PAST MONTH?: NO

## 2025-04-18 ASSESSMENT — ACTIVITIES OF DAILY LIVING (ADL)
ADLS_ACUITY_SCORE: 41

## 2025-04-18 NOTE — ED PROVIDER NOTES
"ED Provider Note  [facility]      History     Chief Complaint   Patient presents with    Medication Refill     HPI  Katharine Piña is a 24 year old female with history of asthma who presents to the emergency department with shortness of breath.  Patient states that she was using her inhaler all night to the point where she has run out of her inhalers.  She states that last night's episode was terrifying and does know what to do and so came in for evaluation.    Patient is on Wixela, Allegra.    Past Medical History  Past Medical History:   Diagnosis Date    Uncomplicated asthma      No past surgical history on file.  albuterol (PROAIR HFA/PROVENTIL HFA/VENTOLIN HFA) 108 (90 Base) MCG/ACT inhaler  albuterol (PROAIR HFA/PROVENTIL HFA/VENTOLIN HFA) 108 (90 Base) MCG/ACT inhaler  albuterol (PROVENTIL) (2.5 MG/3ML) 0.083% neb solution  predniSONE (DELTASONE) 20 MG tablet  Fexofenadine HCl (ALLEGRA PO)  fluticasone-salmeterol (WIXELA INHUB) 100-50 MCG/ACT inhaler      No Known Allergies  Family History  No family history on file.  Social History   Social History     Tobacco Use    Smoking status: Never    Smokeless tobacco: Never   Substance Use Topics    Alcohol use: Yes     Comment: occasional    Drug use: Yes     Types: Marijuana      A medically appropriate review of systems was performed with pertinent positives and negatives noted in the HPI, and all other systems negative.    Physical Exam   BP: 111/78  Pulse: 91  Temp: 98.2  F (36.8  C)  Resp: 14  Height: 157.5 cm (5' 2\")  Weight: 87 kg (191 lb 12.8 oz)  SpO2: 96 %  Physical Exam  Constitutional:       General: She is not in acute distress.     Appearance: Normal appearance. She is not diaphoretic.   HENT:      Head: Atraumatic.      Mouth/Throat:      Mouth: Mucous membranes are moist.   Eyes:      General: No scleral icterus.     Conjunctiva/sclera: Conjunctivae normal.   Cardiovascular:      Rate and Rhythm: Normal rate.      Heart " sounds: Normal heart sounds.   Pulmonary:      Effort: No respiratory distress.      Breath sounds: Wheezing present.   Abdominal:      General: Abdomen is flat.      Tenderness: There is no guarding.   Musculoskeletal:      Cervical back: Neck supple.   Skin:     General: Skin is warm.      Findings: No rash.   Neurological:      General: No focal deficit present.      Mental Status: She is alert and oriented to person, place, and time.      Sensory: No sensory deficit.      Motor: No weakness.      Coordination: Coordination normal.           ED Course, Procedures, & Data      Procedures    Results for orders placed or performed during the hospital encounter of 04/18/25   XR Chest 2 Views     Status: None    Narrative    EXAM: XR CHEST 2 VIEWS  LOCATION: [facility]  DATE: 4/18/2025    INDICATION: sob  COMPARISON: Chest radiograph 8/6/2024      Impression    IMPRESSION: Stable chest radiograph without acute cardiopulmonary abnormality.   Influenza A/B, RSV and SARS-CoV2 PCR (COVID-19) Nasopharyngeal     Status: Normal    Specimen: Nasopharyngeal; Swab   Result Value Ref Range    Influenza A PCR Negative Negative    Influenza B PCR Negative Negative    RSV PCR Negative Negative    SARS CoV2 PCR Negative Negative    Narrative    Testing was performed using the Xpert Xpress CoV2/Flu/RSV Assay on the Cepheid GeneXpert Instrument. This test should be ordered for the detection of SARS-CoV2, influenza, and RSV viruses in individuals with signs and symptoms of respiratory tract infection. This test is for in vitro diagnostic use under the [place] FDA for laboratories certified under CLIA to perform high or moderate complexity testing. This test has been [place] FDA cleared. A negative result does not rule out the presence of PCR inhibitors in the specimen or target RNA in concentration below the limit of detection for the assay. If only one viral target is positive but coinfection with  multiple targets is suspected, the sample should be re-tested with another FDA cleared, approved, or authorized test, if coninfection would change clinical management. This test was validated by the [facility]. These laboratories are certified under the Clinical Laboratory Improvement Amendments of 1988 (CLIA-88) as qualified to perfom high complexity laboratory testing.     Medications   ipratropium - albuterol 0.5 mg/2.5 mg/3 mL (DUONEB) neb solution 3 mL (3 mLs Nebulization $Given 4/18/25 1034)     Labs Ordered and Resulted from Time of ED Arrival to Time of ED Departure   INFLUENZA A/B, RSV AND SARS-COV2 PCR - Normal       Result Value    Influenza A PCR Negative      Influenza B PCR Negative      RSV PCR Negative      SARS CoV2 PCR Negative       XR Chest 2 Views   Final Result   IMPRESSION: Stable chest radiograph without acute cardiopulmonary abnormality.             Critical care was not performed.     Medical Decision Making  The patient's presentation was of moderate complexity (an acute illness with systemic symptoms).    The patient's evaluation involved:  ordering and/or review of 3+ test(s) in this encounter (see separate area of note for details)    The patient's management necessitated moderate risk (prescription drug management including medications given in the ED).    Assessment & Plan        I have reviewed the nursing notes. I have reviewed the findings, diagnosis, plan and need for follow up with the patient.    Discharge Medication List as of 4/18/2025 12:05 PM        START taking these medications    Details   !! albuterol (PROAIR HFA/PROVENTIL HFA/VENTOLIN HFA) 108 (90 Base) MCG/ACT inhaler Inhale 2 puffs into the lungs every 6 hours as needed for shortness of breath, wheezing or cough., Disp-18 g, R-0, E-PrescribePharmacy may dispense brand covered by insurance (Proair, or proventil or ventolin or generic albuterol inhaler)      albuterol (PROVENTIL) (2.5 MG/3ML) 0.083%  neb solution Take 1 vial (2.5 mg) by nebulization every 6 hours as needed for shortness of breath, wheezing or cough., Disp-90 mL, R-0, E-Prescribe       !! - Potential duplicate medications found. Please discuss with provider.          Final diagnoses:   Viral URI   Mild intermittent asthma with exacerbation       Alber Truong  [facility] EMERGENCY DEPARTMENT  4/18/2025     Alber Truong MD  04/18/25 7221

## 2025-04-18 NOTE — ED NOTES
Bed: ED03  Expected date:   Expected time:   Means of arrival:   Comments:  Floors then D.P. in [place]

## 2025-04-18 NOTE — DISCHARGE INSTRUCTIONS
To home with prescriptions for both nebulizer and inhaler during acute infection with increased wheezing May use nebulizer every 6 hours follow-up with your prime MD for recheck or return if marked increase in shortness of breath

## 2025-04-18 NOTE — LETTER
April 18, 2025      To Whom It May Concern:      Katharine Piña was seen in our Emergency Department today, 04/18/25.  I expect her condition to improve over the next 1-2 days.  She may return to work when improved.    Sincerely,        Alber Truong MD  Electronically signed